# Patient Record
Sex: FEMALE | Race: WHITE | HISPANIC OR LATINO | Employment: UNEMPLOYED | URBAN - METROPOLITAN AREA
[De-identification: names, ages, dates, MRNs, and addresses within clinical notes are randomized per-mention and may not be internally consistent; named-entity substitution may affect disease eponyms.]

---

## 2018-01-15 ENCOUNTER — HOSPITAL ENCOUNTER (INPATIENT)
Facility: HOSPITAL | Age: 58
LOS: 1 days | Discharge: HOME | DRG: 419 | End: 2018-01-18
Attending: EMERGENCY MEDICINE | Admitting: STUDENT IN AN ORGANIZED HEALTH CARE EDUCATION/TRAINING PROGRAM

## 2018-01-15 DIAGNOSIS — K81.9 CHOLECYSTITIS: Primary | ICD-10-CM

## 2018-01-15 DIAGNOSIS — K80.63 CALCULUS OF GALLBLADDER AND BILE DUCT WITH ACUTE CHOLECYSTITIS, WITH OBSTRUCTION: ICD-10-CM

## 2018-01-15 DIAGNOSIS — R10.9 ABDOMINAL PAIN: ICD-10-CM

## 2018-01-15 PROBLEM — K80.01 CHOLELITHIASIS WITH ACUTE CHOLECYSTITIS WITH BILIARY OBSTRUCTION: Status: ACTIVE | Noted: 2018-01-15

## 2018-01-15 LAB
ALBUMIN SERPL BCP-MCNC: 4 G/DL
ALP SERPL-CCNC: 224 U/L
ALT SERPL W/O P-5'-P-CCNC: 102 U/L
AMORPH CRY URNS QL MICRO: ABNORMAL
ANION GAP SERPL CALC-SCNC: 8 MMOL/L
AST SERPL-CCNC: 165 U/L
BACTERIA #/AREA URNS HPF: ABNORMAL /HPF
BASOPHILS # BLD AUTO: 0.02 K/UL
BASOPHILS NFR BLD: 0.3 %
BILIRUB SERPL-MCNC: 1.1 MG/DL
BILIRUB UR QL STRIP: NEGATIVE
BUN SERPL-MCNC: 11 MG/DL
CALCIUM SERPL-MCNC: 9.8 MG/DL
CHLORIDE SERPL-SCNC: 105 MMOL/L
CLARITY UR: ABNORMAL
CO2 SERPL-SCNC: 30 MMOL/L
COLOR UR: YELLOW
CREAT SERPL-MCNC: 0.7 MG/DL
DIFFERENTIAL METHOD: NORMAL
EOSINOPHIL # BLD AUTO: 0.1 K/UL
EOSINOPHIL NFR BLD: 1.6 %
ERYTHROCYTE [DISTWIDTH] IN BLOOD BY AUTOMATED COUNT: 13.3 %
EST. GFR  (AFRICAN AMERICAN): >60 ML/MIN/1.73 M^2
EST. GFR  (NON AFRICAN AMERICAN): >60 ML/MIN/1.73 M^2
GLUCOSE SERPL-MCNC: 126 MG/DL
GLUCOSE UR QL STRIP: NEGATIVE
HCT VFR BLD AUTO: 42.8 %
HGB BLD-MCNC: 14 G/DL
HGB UR QL STRIP: NEGATIVE
KETONES UR QL STRIP: NEGATIVE
LEUKOCYTE ESTERASE UR QL STRIP: NEGATIVE
LIPASE SERPL-CCNC: 27 U/L
LYMPHOCYTES # BLD AUTO: 1.8 K/UL
LYMPHOCYTES NFR BLD: 22 %
MCH RBC QN AUTO: 28 PG
MCHC RBC AUTO-ENTMCNC: 32.7 G/DL
MCV RBC AUTO: 86 FL
MICROSCOPIC COMMENT: ABNORMAL
MONOCYTES # BLD AUTO: 0.7 K/UL
MONOCYTES NFR BLD: 9.2 %
NEUTROPHILS # BLD AUTO: 5.3 K/UL
NEUTROPHILS NFR BLD: 66.8 %
NITRITE UR QL STRIP: NEGATIVE
PH UR STRIP: >8 [PH] (ref 5–8)
PLATELET # BLD AUTO: 274 K/UL
PMV BLD AUTO: 10.6 FL
POTASSIUM SERPL-SCNC: 3.8 MMOL/L
PROT SERPL-MCNC: 7.8 G/DL
PROT UR QL STRIP: ABNORMAL
RBC # BLD AUTO: 5 M/UL
RBC #/AREA URNS HPF: 0 /HPF (ref 0–4)
SODIUM SERPL-SCNC: 143 MMOL/L
SP GR UR STRIP: 1.02 (ref 1–1.03)
SQUAMOUS #/AREA URNS HPF: ABNORMAL /HPF
URN SPEC COLLECT METH UR: ABNORMAL
UROBILINOGEN UR STRIP-ACNC: ABNORMAL EU/DL
WBC # BLD AUTO: 7.97 K/UL
WBC #/AREA URNS HPF: 0 /HPF (ref 0–5)

## 2018-01-15 PROCEDURE — 25000003 PHARM REV CODE 250: Performed by: EMERGENCY MEDICINE

## 2018-01-15 PROCEDURE — 80053 COMPREHEN METABOLIC PANEL: CPT

## 2018-01-15 PROCEDURE — 25000003 PHARM REV CODE 250: Performed by: STUDENT IN AN ORGANIZED HEALTH CARE EDUCATION/TRAINING PROGRAM

## 2018-01-15 PROCEDURE — 85025 COMPLETE CBC W/AUTO DIFF WBC: CPT

## 2018-01-15 PROCEDURE — 94761 N-INVAS EAR/PLS OXIMETRY MLT: CPT

## 2018-01-15 PROCEDURE — 93005 ELECTROCARDIOGRAM TRACING: CPT

## 2018-01-15 PROCEDURE — 83690 ASSAY OF LIPASE: CPT

## 2018-01-15 PROCEDURE — 63600175 PHARM REV CODE 636 W HCPCS: Performed by: STUDENT IN AN ORGANIZED HEALTH CARE EDUCATION/TRAINING PROGRAM

## 2018-01-15 PROCEDURE — G0378 HOSPITAL OBSERVATION PER HR: HCPCS

## 2018-01-15 PROCEDURE — 81000 URINALYSIS NONAUTO W/SCOPE: CPT

## 2018-01-15 PROCEDURE — 99284 EMERGENCY DEPT VISIT MOD MDM: CPT

## 2018-01-15 RX ORDER — SODIUM CHLORIDE 9 MG/ML
INJECTION, SOLUTION INTRAVENOUS CONTINUOUS
Status: DISCONTINUED | OUTPATIENT
Start: 2018-01-15 | End: 2018-01-17

## 2018-01-15 RX ORDER — HEPARIN SODIUM 5000 [USP'U]/ML
5000 INJECTION, SOLUTION INTRAVENOUS; SUBCUTANEOUS EVERY 8 HOURS
Status: DISCONTINUED | OUTPATIENT
Start: 2018-01-15 | End: 2018-01-18 | Stop reason: HOSPADM

## 2018-01-15 RX ORDER — HYDROCODONE BITARTRATE AND ACETAMINOPHEN 5; 325 MG/1; MG/1
1 TABLET ORAL EVERY 4 HOURS PRN
Status: DISCONTINUED | OUTPATIENT
Start: 2018-01-15 | End: 2018-01-18 | Stop reason: HOSPADM

## 2018-01-15 RX ORDER — ONDANSETRON 4 MG/1
8 TABLET, ORALLY DISINTEGRATING ORAL
Status: COMPLETED | OUTPATIENT
Start: 2018-01-15 | End: 2018-01-15

## 2018-01-15 RX ORDER — HYDROCODONE BITARTRATE AND ACETAMINOPHEN 5; 325 MG/1; MG/1
1 TABLET ORAL
Status: COMPLETED | OUTPATIENT
Start: 2018-01-15 | End: 2018-01-15

## 2018-01-15 RX ORDER — ONDANSETRON 8 MG/1
8 TABLET, ORALLY DISINTEGRATING ORAL EVERY 8 HOURS PRN
Status: DISCONTINUED | OUTPATIENT
Start: 2018-01-15 | End: 2018-01-18 | Stop reason: HOSPADM

## 2018-01-15 RX ADMIN — HYDROCODONE BITARTRATE AND ACETAMINOPHEN 1 TABLET: 5; 325 TABLET ORAL at 06:01

## 2018-01-15 RX ADMIN — HEPARIN SODIUM 5000 UNITS: 5000 INJECTION, SOLUTION INTRAVENOUS; SUBCUTANEOUS at 09:01

## 2018-01-15 RX ADMIN — HYDROCODONE BITARTRATE AND ACETAMINOPHEN 1 TABLET: 5; 325 TABLET ORAL at 10:01

## 2018-01-15 RX ADMIN — SODIUM CHLORIDE: 0.9 INJECTION, SOLUTION INTRAVENOUS at 03:01

## 2018-01-15 RX ADMIN — ONDANSETRON 8 MG: 4 TABLET, ORALLY DISINTEGRATING ORAL at 10:01

## 2018-01-15 RX ADMIN — SODIUM CHLORIDE: 0.9 INJECTION, SOLUTION INTRAVENOUS at 09:01

## 2018-01-15 NOTE — H&P
Patient ID: Radha Livingston is a 57 y.o. female.    Chief Complaint: Abdominal Pain (epigastric pain x days.pain radiates to back. )      HPI:  57F presented to ED with RUQ pain for 3 days. Pain is severe, constant. She had a minor episode of similar type pain 6 months ago that resolved shortly after it began. Denies fever.  Some nausea but no vomiting. Regular BMs.         Review of Systems   Constitutional: Negative for fever.   HENT: Negative for trouble swallowing.    Respiratory: Negative for shortness of breath.    Cardiovascular: Negative for chest pain.   Gastrointestinal: Positive for abdominal pain and nausea. Negative for blood in stool, constipation, diarrhea and vomiting.   Genitourinary: Negative for dysuria.   Musculoskeletal: Negative for joint swelling.   Skin: Negative for rash and wound.   Allergic/Immunologic: Negative for immunocompromised state.   Neurological: Negative for weakness.   Hematological: Does not bruise/bleed easily.   Psychiatric/Behavioral: Negative for agitation.       No current facility-administered medications for this encounter.      No current outpatient prescriptions on file.       Review of patient's allergies indicates:  No Known Allergies    No past medical history on file.    No past surgical history on file.    No family history on file.    Social History     Social History    Marital status:      Spouse name: N/A    Number of children: N/A    Years of education: N/A     Occupational History    Not on file.     Social History Main Topics    Smoking status: Not on file    Smokeless tobacco: Not on file    Alcohol use Not on file    Drug use: Unknown    Sexual activity: Not on file     Other Topics Concern    Not on file     Social History Narrative    No narrative on file       Vitals:    01/15/18 1150   BP: 115/65   Pulse: 85   Resp: (!) 22   Temp:        Physical Exam   Constitutional: She is oriented to person, place, and time. She appears  well-nourished. No distress.   Cardiovascular: Normal rate and regular rhythm.    Pulmonary/Chest: Effort normal. No stridor. No respiratory distress.   Abdominal: Soft. She exhibits no distension and no mass. There is tenderness. No hernia.   Mild RUQ TTP   Lymphadenopathy:     She has no cervical adenopathy.   Neurological: She is alert and oriented to person, place, and time.   Skin: Skin is warm. No erythema.   Psychiatric: She has a normal mood and affect. Her behavior is normal.     AST/ALT mildy elevated  Lipase normal  WBC normal  US gb wall, 2cm gallstones, cbd 8mm  Tbili 1.1    Assessment & Plan:   57F with cholecystitis, choledocholithiasis  Admit to surgery  Trend labs  NPO  IVF  Lap rupali with ioc possibly tomorrow if labs normalize, otherwise will consult GI

## 2018-01-15 NOTE — PROGRESS NOTES
Patient is an admit from ED.  Patient is a Urdu-speaking woman is awake, alert, and oriented.  Nephew and brother are at bedside and able to translate for patient at this time.  Patient denies shortness of breath but has mild abdominal pain.  Patient is resting in bed at this time.  Safety is maintained with bed low, wheels locked, and side rails up.  Call light within reach.  Will continue to monitor.

## 2018-01-15 NOTE — ED NOTES
"Pt reports that she last ate last night at 6pm. Pt reports that she had a couple sips of water "not too long ago."  "

## 2018-01-15 NOTE — ED NOTES
Pt undressed and in gown. Headset and clothes put in pt belongings bag and given to pt's son at bedside

## 2018-01-15 NOTE — ED PROVIDER NOTES
Encounter Date: 1/15/2018    SCRIBE #1 NOTE: I, Ashley Staci, am scribing for, and in the presence of, Dr. Mora.       History     Chief Complaint   Patient presents with    Abdominal Pain     epigastric pain x days.pain radiates to back.      Time seen by provider: 0950    This is a 57 y.o. female who presents with complaint of constant epigastric RUQ pain radiating to the back and chest onset 3 days ago. Her pain is described as flipping. The pain woke her from her sleep 3 nights ago. The patient reports some relief last night with the pain returning this morning. She took Alkaseltzer 2 days ago and OTC acid medicine this morning with no improvement. She had similar symptoms 6 months ago however the pain resolved. The patient associates nausea and stress due to the recent loss of her mother. The patient denies vomiting, diarrhea, dysuria. She has been able to keep her food down and eat normally since the onset of her symptoms. Her last BM was this morning. She drinks alcohol occasionally and does not smoke cigarettes.  No etoh over the past week. She has no known PMHx. The patient's family members served as an .       The history is provided by the patient. The history is limited by a language barrier. A  was used.     Review of patient's allergies indicates:  No Known Allergies  No past medical history on file.  No past surgical history on file.  No family history on file.  Social History   Substance Use Topics    Smoking status: Not on file    Smokeless tobacco: Not on file    Alcohol use Not on file     Review of Systems   Constitutional: Negative for activity change, appetite change, chills, diaphoresis and fatigue.   Respiratory: Negative for cough, chest tightness and shortness of breath.    Cardiovascular: Negative for chest pain.   Gastrointestinal: Positive for abdominal pain (Epigastric, RUQ) and nausea. Negative for diarrhea and vomiting.   Endocrine: Negative for  polydipsia and polyphagia.   Genitourinary: Negative for difficulty urinating, dysuria, flank pain and frequency.   Musculoskeletal: Negative for myalgias.   Skin: Negative for pallor and rash.   Allergic/Immunologic: Negative for immunocompromised state.   Neurological: Negative for dizziness and headaches.   Psychiatric/Behavioral: Negative for confusion. The patient is nervous/anxious (stress due to loss of her mother).    All other systems reviewed and are negative.      Physical Exam     Initial Vitals [01/15/18 0912]   BP Pulse Resp Temp SpO2   (!) 144/76 (!) 114 18 98.8 °F (37.1 °C) 96 %      MAP       98.67         Physical Exam    Nursing note and vitals reviewed.  Constitutional: She appears well-developed and well-nourished. She is not diaphoretic. No distress.   Does not appear dehydrated.    HENT:   Head: Normocephalic and atraumatic.   Mouth/Throat: Oropharynx is clear and moist.   Eyes: Conjunctivae and EOM are normal. No scleral icterus.   Neck: Normal range of motion. Neck supple.   Cardiovascular: Normal rate, regular rhythm, normal heart sounds and intact distal pulses. Exam reveals no gallop and no friction rub.    No murmur heard.  Pulmonary/Chest: Breath sounds normal. She has no wheezes. She has no rhonchi. She has no rales.   Abdominal: Soft. Bowel sounds are normal. She exhibits no distension. There is tenderness (epigastric and RUQ tenderness to palpation). There is no rebound and no guarding.   No rigidity   Musculoskeletal: Normal range of motion.   Neurological: She is alert and oriented to person, place, and time.   Skin: Skin is warm and dry. No rash noted. No erythema.   Psychiatric: She has a normal mood and affect. Her behavior is normal. Thought content normal.         ED Course   Procedures  Labs Reviewed   COMPREHENSIVE METABOLIC PANEL - Abnormal; Notable for the following:        Result Value    CO2 30 (*)     Glucose 126 (*)     Total Bilirubin 1.1 (*)     Alkaline Phosphatase  224 (*)      (*)      (*)     All other components within normal limits   URINALYSIS - Abnormal; Notable for the following:     Appearance, UA Cloudy (*)     pH, UA >8.0 (*)     Protein, UA Trace (*)     Urobilinogen, UA 4.0-6.0 (*)     All other components within normal limits   URINALYSIS MICROSCOPIC - Abnormal; Notable for the following:     Amorphous, UA Many (*)     All other components within normal limits   CBC W/ AUTO DIFFERENTIAL   LIPASE     EKG Readings: (Independently Interpreted)   Initial Reading: No STEMI. Rhythm: Sinus Tachycardia. Heart Rate: 102. Ectopy: No Ectopy. Clinical Impression: Sinus Tachycardia   Normal conduction, no ectopy, no prior EKG for comparison.        X-Rays:   Independently Interpreted Readings:   Other Readings:  Reviewed by myself, read by radiology.      US Abdomen Limited   Final Result   Abnormal         Cholelithiasis with gallbladder wall thickening and pericholecystic fluid.  Unable to assess for a Tracy's sign given patient's prior administration of pain medications.  Findings can be seen in acute and chronic cholecystitis as well as liver disease, heart failure or hypoalbuminemia.  Further evaluation could be obtained with HIDA scan as clinically warranted.      Hepatic steatosis.      This report was Flagged in the Epic Medical record.          Electronically signed by: VALENTINO PIZANO MD   Date:     01/15/18   Time:    10:59          Medical Decision Making:   Initial Assessment:   This is a 57 year old female with no PMHx who presents with RUQ and epigastric tenderness. Will treat with PO pain and nausea medication and obtain blood work, urinalysis, EKG, US of gall bladder.  Differential Diagnosis:   Pancreatitis, gastritis, gall stone, cholecystitis, cholelithiasis, anxiety.  Independently Interpreted Test(s):   I have ordered and independently interpreted X-rays - see prior notes.  I have ordered and independently interpreted EKG Reading(s) - see  prior notes  Clinical Tests:   Lab Tests: Ordered and Reviewed  Radiological Study: Ordered and Reviewed  Medical Tests: Ordered and Reviewed  ED Management:  1144 Findings consistent with cholecystitis. She also has elevated liever enzymes, normal wbc, is afebrile, normal lipase. I paged Dr. Hull with [facility] to discuss her CT findings.    1220 I spoke with Dr. Hull about the case. He will admit the patient for surgery.  Pt and her family agree with the plan       Other:   I have discussed this case with another health care provider.                   ED Course      Clinical Impression:     1. Cholecystitis    2. Abdominal pain         Disposition:   Disposition: Admitted  Condition: Stable       Scribe attestation:I, Dr. Shea Mora, personally performed the services described in this documentation. All medical record entries made by the scribe were at my direction and in my presence.  I have reviewed the chart and agree that the record reflects my personal performance and is accurate and complete. Shea Mora MD.  3:49 PM 01/15/2018                      Shea Mora MD  01/15/18 8000

## 2018-01-15 NOTE — ED NOTES
Pt here c/o mid epigastric pain described as cramping with nausea for last 3 days--sometimes it radiates to bilateral chest and goes through to her back. Denies history of diabetes or heart problems. Skin WDL> pt is AAOx3,speech is clear. resp are regular and unlabored. Pt's mother dies unexpectedly Friday. Pt denies any daily medicines. Pt unable to describe the chest and back pain.

## 2018-01-16 ENCOUNTER — ANESTHESIA EVENT (OUTPATIENT)
Dept: ENDOSCOPY | Facility: HOSPITAL | Age: 58
DRG: 419 | End: 2018-01-16

## 2018-01-16 ENCOUNTER — SURGERY (OUTPATIENT)
Age: 58
End: 2018-01-16

## 2018-01-16 ENCOUNTER — ANESTHESIA (OUTPATIENT)
Dept: SURGERY | Facility: HOSPITAL | Age: 58
End: 2018-01-16

## 2018-01-16 ENCOUNTER — ANESTHESIA (OUTPATIENT)
Dept: ENDOSCOPY | Facility: HOSPITAL | Age: 58
DRG: 419 | End: 2018-01-16

## 2018-01-16 ENCOUNTER — TELEPHONE (OUTPATIENT)
Dept: GASTROENTEROLOGY | Facility: CLINIC | Age: 58
End: 2018-01-16

## 2018-01-16 ENCOUNTER — ANESTHESIA EVENT (OUTPATIENT)
Dept: SURGERY | Facility: HOSPITAL | Age: 58
DRG: 419 | End: 2018-01-16

## 2018-01-16 ENCOUNTER — ANESTHESIA EVENT (OUTPATIENT)
Dept: SURGERY | Facility: HOSPITAL | Age: 58
End: 2018-01-16

## 2018-01-16 LAB
ALBUMIN SERPL BCP-MCNC: 3.4 G/DL
ALP SERPL-CCNC: 413 U/L
ALT SERPL W/O P-5'-P-CCNC: 1066 U/L
ANION GAP SERPL CALC-SCNC: 9 MMOL/L
AST SERPL-CCNC: 1042 U/L
BASOPHILS # BLD AUTO: 0.01 K/UL
BASOPHILS NFR BLD: 0.2 %
BILIRUB SERPL-MCNC: 3.2 MG/DL
BUN SERPL-MCNC: 7 MG/DL
CALCIUM SERPL-MCNC: 9.1 MG/DL
CHLORIDE SERPL-SCNC: 106 MMOL/L
CO2 SERPL-SCNC: 25 MMOL/L
CREAT SERPL-MCNC: 0.7 MG/DL
DIFFERENTIAL METHOD: NORMAL
EOSINOPHIL # BLD AUTO: 0.1 K/UL
EOSINOPHIL NFR BLD: 2.5 %
ERYTHROCYTE [DISTWIDTH] IN BLOOD BY AUTOMATED COUNT: 13.5 %
EST. GFR  (AFRICAN AMERICAN): >60 ML/MIN/1.73 M^2
EST. GFR  (NON AFRICAN AMERICAN): >60 ML/MIN/1.73 M^2
GLUCOSE SERPL-MCNC: 103 MG/DL
HCT VFR BLD AUTO: 39.4 %
HGB BLD-MCNC: 12.8 G/DL
LYMPHOCYTES # BLD AUTO: 1.3 K/UL
LYMPHOCYTES NFR BLD: 26.5 %
MCH RBC QN AUTO: 27.8 PG
MCHC RBC AUTO-ENTMCNC: 32.5 G/DL
MCV RBC AUTO: 86 FL
MONOCYTES # BLD AUTO: 0.5 K/UL
MONOCYTES NFR BLD: 10.9 %
NEUTROPHILS # BLD AUTO: 2.9 K/UL
NEUTROPHILS NFR BLD: 59.9 %
PLATELET # BLD AUTO: 256 K/UL
PMV BLD AUTO: 10.6 FL
POTASSIUM SERPL-SCNC: 3.7 MMOL/L
PROT SERPL-MCNC: 6.7 G/DL
RBC # BLD AUTO: 4.6 M/UL
SODIUM SERPL-SCNC: 140 MMOL/L
WBC # BLD AUTO: 4.79 K/UL

## 2018-01-16 PROCEDURE — C1769 GUIDE WIRE: HCPCS | Performed by: INTERNAL MEDICINE

## 2018-01-16 PROCEDURE — 25000003 PHARM REV CODE 250: Performed by: STUDENT IN AN ORGANIZED HEALTH CARE EDUCATION/TRAINING PROGRAM

## 2018-01-16 PROCEDURE — 0FC98ZZ EXTIRPATION OF MATTER FROM COMMON BILE DUCT, VIA NATURAL OR ARTIFICIAL OPENING ENDOSCOPIC: ICD-10-PCS | Performed by: INTERNAL MEDICINE

## 2018-01-16 PROCEDURE — 0F7D8DZ DILATION OF PANCREATIC DUCT WITH INTRALUMINAL DEVICE, VIA NATURAL OR ARTIFICIAL OPENING ENDOSCOPIC: ICD-10-PCS | Performed by: INTERNAL MEDICINE

## 2018-01-16 PROCEDURE — 85025 COMPLETE CBC W/AUTO DIFF WBC: CPT

## 2018-01-16 PROCEDURE — 43274 ERCP DUCT STENT PLACEMENT: CPT | Mod: 59,,, | Performed by: INTERNAL MEDICINE

## 2018-01-16 PROCEDURE — 37000009 HC ANESTHESIA EA ADD 15 MINS: Performed by: INTERNAL MEDICINE

## 2018-01-16 PROCEDURE — 63600175 PHARM REV CODE 636 W HCPCS: Performed by: STUDENT IN AN ORGANIZED HEALTH CARE EDUCATION/TRAINING PROGRAM

## 2018-01-16 PROCEDURE — 27202125 HC BALLOON, EXTRACTION (ANY): Performed by: INTERNAL MEDICINE

## 2018-01-16 PROCEDURE — 36415 COLL VENOUS BLD VENIPUNCTURE: CPT

## 2018-01-16 PROCEDURE — 94761 N-INVAS EAR/PLS OXIMETRY MLT: CPT

## 2018-01-16 PROCEDURE — 43274 ERCP DUCT STENT PLACEMENT: CPT | Performed by: INTERNAL MEDICINE

## 2018-01-16 PROCEDURE — C2617 STENT, NON-COR, TEM W/O DEL: HCPCS | Performed by: INTERNAL MEDICINE

## 2018-01-16 PROCEDURE — 27202127 HC STENT INTRODUCER: Performed by: INTERNAL MEDICINE

## 2018-01-16 PROCEDURE — 25000003 PHARM REV CODE 250: Performed by: SURGERY

## 2018-01-16 PROCEDURE — 63600175 PHARM REV CODE 636 W HCPCS: Performed by: NURSE ANESTHETIST, CERTIFIED REGISTERED

## 2018-01-16 PROCEDURE — 43264 ERCP REMOVE DUCT CALCULI: CPT | Performed by: INTERNAL MEDICINE

## 2018-01-16 PROCEDURE — 37000008 HC ANESTHESIA 1ST 15 MINUTES: Performed by: INTERNAL MEDICINE

## 2018-01-16 PROCEDURE — 99232 SBSQ HOSP IP/OBS MODERATE 35: CPT | Mod: ,,, | Performed by: STUDENT IN AN ORGANIZED HEALTH CARE EDUCATION/TRAINING PROGRAM

## 2018-01-16 PROCEDURE — 0F798DZ DILATION OF COMMON BILE DUCT WITH INTRALUMINAL DEVICE, VIA NATURAL OR ARTIFICIAL OPENING ENDOSCOPIC: ICD-10-PCS | Performed by: INTERNAL MEDICINE

## 2018-01-16 PROCEDURE — 74330 X-RAY BILE/PANC ENDOSCOPY: CPT | Mod: 26,,, | Performed by: INTERNAL MEDICINE

## 2018-01-16 PROCEDURE — 80053 COMPREHEN METABOLIC PANEL: CPT

## 2018-01-16 PROCEDURE — 27202304 HC CANNULA, ERCP: Performed by: INTERNAL MEDICINE

## 2018-01-16 PROCEDURE — 43264 ERCP REMOVE DUCT CALCULI: CPT | Mod: 51,,, | Performed by: INTERNAL MEDICINE

## 2018-01-16 PROCEDURE — G0378 HOSPITAL OBSERVATION PER HR: HCPCS

## 2018-01-16 RX ORDER — FENTANYL CITRATE 50 UG/ML
INJECTION, SOLUTION INTRAMUSCULAR; INTRAVENOUS
Status: DISCONTINUED | OUTPATIENT
Start: 2018-01-16 | End: 2018-01-16

## 2018-01-16 RX ORDER — INDOMETHACIN 50 MG/1
100 SUPPOSITORY RECTAL ONCE
Status: DISCONTINUED | OUTPATIENT
Start: 2018-01-16 | End: 2018-01-18 | Stop reason: HOSPADM

## 2018-01-16 RX ORDER — MIDAZOLAM HYDROCHLORIDE 1 MG/ML
INJECTION, SOLUTION INTRAMUSCULAR; INTRAVENOUS
Status: DISCONTINUED | OUTPATIENT
Start: 2018-01-16 | End: 2018-01-16

## 2018-01-16 RX ORDER — PROPOFOL 10 MG/ML
VIAL (ML) INTRAVENOUS CONTINUOUS PRN
Status: DISCONTINUED | OUTPATIENT
Start: 2018-01-16 | End: 2018-01-16

## 2018-01-16 RX ORDER — PROPOFOL 10 MG/ML
VIAL (ML) INTRAVENOUS
Status: DISCONTINUED | OUTPATIENT
Start: 2018-01-16 | End: 2018-01-16

## 2018-01-16 RX ORDER — LIDOCAINE HCL/PF 100 MG/5ML
SYRINGE (ML) INTRAVENOUS
Status: DISCONTINUED | OUTPATIENT
Start: 2018-01-16 | End: 2018-01-16

## 2018-01-16 RX ORDER — DEXTROSE MONOHYDRATE, SODIUM CHLORIDE, AND POTASSIUM CHLORIDE 50; 1.49; 4.5 G/1000ML; G/1000ML; G/1000ML
INJECTION, SOLUTION INTRAVENOUS CONTINUOUS
Status: DISCONTINUED | OUTPATIENT
Start: 2018-01-16 | End: 2018-01-16

## 2018-01-16 RX ADMIN — PROPOFOL 50 MG: 10 INJECTION, EMULSION INTRAVENOUS at 01:01

## 2018-01-16 RX ADMIN — MIDAZOLAM 2 MG: 1 INJECTION INTRAMUSCULAR; INTRAVENOUS at 01:01

## 2018-01-16 RX ADMIN — FENTANYL CITRATE 50 MCG: 50 INJECTION, SOLUTION INTRAMUSCULAR; INTRAVENOUS at 01:01

## 2018-01-16 RX ADMIN — LIDOCAINE HYDROCHLORIDE 50 MG: 20 INJECTION, SOLUTION INTRAVENOUS at 01:01

## 2018-01-16 RX ADMIN — HYDROCODONE BITARTRATE AND ACETAMINOPHEN 1 TABLET: 5; 325 TABLET ORAL at 09:01

## 2018-01-16 RX ADMIN — DEXTROSE MONOHYDRATE, SODIUM CHLORIDE, AND POTASSIUM CHLORIDE: 50; 4.5; 1.49 INJECTION, SOLUTION INTRAVENOUS at 11:01

## 2018-01-16 RX ADMIN — HEPARIN SODIUM 5000 UNITS: 5000 INJECTION, SOLUTION INTRAVENOUS; SUBCUTANEOUS at 09:01

## 2018-01-16 RX ADMIN — HYDROCODONE BITARTRATE AND ACETAMINOPHEN 1 TABLET: 5; 325 TABLET ORAL at 04:01

## 2018-01-16 RX ADMIN — PROPOFOL 150 MCG/KG/MIN: 10 INJECTION, EMULSION INTRAVENOUS at 01:01

## 2018-01-16 RX ADMIN — SODIUM CHLORIDE: 0.9 INJECTION, SOLUTION INTRAVENOUS at 09:01

## 2018-01-16 NOTE — ANESTHESIA PREPROCEDURE EVALUATION
01/16/2018  Radha Livingston is a 57 y.o., female having an ERCP and probable lap rupali tomorrow.  NPO  today, NKDA, speaks only Tajik, admitted for abdominal pain.    Anesthesia Evaluation    I have reviewed the Patient Summary Reports.    I have reviewed the Nursing Notes.      Review of Systems  Anesthesia Hx:  No problems with previous Anesthesia Denies Hx of Anesthetic complications    Social:  Non-Smoker    Hematology/Oncology:  Hematology Normal        Cardiovascular:  Cardiovascular Normal Exercise tolerance: good  ECG has been reviewed.   ECG ST   Pulmonary:  Pulmonary Normal    Renal/:  Renal/ Normal     Hepatic/GI:   Abdominal pain   Neurological:  Neurology Normal    Endocrine:  Endocrine Normal        Physical Exam  General:  Well nourished    Airway/Jaw/Neck:  Airway Findings: Mouth Opening: Normal Tongue: Normal  General Airway Assessment: Adult  Mallampati: III  Improves to II with phonation.  TM Distance: Normal, at least 6 cm  Jaw/Neck Findings:  Neck ROM: Normal ROM       Chest/Lungs:  Chest/Lungs Findings: Clear to auscultation, Normal Respiratory Rate     Heart/Vascular:  Heart Findings: Rate: Normal  Rhythm: Regular Rhythm  Sounds: Normal        Mental Status:  Mental Status Findings:  Cooperative       Lab Results   Component Value Date    WBC 4.79 01/16/2018    HGB 12.8 01/16/2018    HCT 39.4 01/16/2018     01/16/2018    ALT 1,066 (H) 01/16/2018    AST 1,042 (H) 01/16/2018     01/16/2018    K 3.7 01/16/2018     01/16/2018    CREATININE 0.7 01/16/2018    BUN 7 01/16/2018    CO2 25 01/16/2018     Wt Readings from Last 3 Encounters:   01/15/18 58 kg (127 lb 13.9 oz)     Temp Readings from Last 3 Encounters:   01/16/18 36.8 °C (98.3 °F) (Oral)     BP Readings from Last 3 Encounters:   01/16/18 121/67     Pulse Readings from Last 3 Encounters:   01/16/18 66          Anesthesia Plan  Type of Anesthesia, risks & benefits discussed:  Anesthesia Type:  general  Patient's Preference:   Intra-op Monitoring Plan: standard ASA monitors  Intra-op Monitoring Plan Comments:   Post Op Pain Control Plan: multimodal analgesia  Post Op Pain Control Plan Comments:   Induction:   IV  Beta Blocker:         Informed Consent: Patient understands risks and agrees with Anesthesia plan.  Questions answered. Anesthesia consent signed with patient.  ASA Score: 2     Day of Surgery Review of History & Physical:    H&P update referred to the surgeon.         Ready For Surgery From Anesthesia Perspective.

## 2018-01-16 NOTE — ANESTHESIA POSTPROCEDURE EVALUATION
"Anesthesia Post Evaluation    Patient: Radha Livingston    Procedure(s) Performed: Procedure(s) (LRB):  ERCP (N/A)    Final Anesthesia Type: MAC  Patient location during evaluation: GI PACU  Patient participation: Yes- Able to Participate  Level of consciousness: awake and responds to stimulation  Post-procedure vital signs: reviewed and stable  Pain management: adequate  Airway patency: patent  PONV status at discharge: No PONV  Anesthetic complications: no      Cardiovascular status: blood pressure returned to baseline and hemodynamically stable  Respiratory status: unassisted, spontaneous ventilation and room air  Hydration status: euvolemic  Follow-up not needed.        Visit Vitals  /67 (Patient Position: Lying)   Pulse 66   Temp 36.8 °C (98.3 °F) (Oral)   Resp 18   Ht 4' 11" (1.499 m)   Wt 58 kg (127 lb 13.9 oz)   SpO2 96%   Breastfeeding? No   BMI 25.83 kg/m²       Pain/Natividad Score: Pain Assessment Performed: Yes (1/16/2018 11:09 AM)  Presence of Pain: denies (1/16/2018 11:09 AM)  Pain Rating Prior to Med Admin: 7 (1/16/2018  9:12 AM)      "

## 2018-01-16 NOTE — DISCHARGE INSTRUCTIONS
Post ERCP Discharge Instructions:     Radha Livingston  1/16/2018  Edward Hull MD    1. Diet: Try sips of water first. If tolerated, resume your regular diet or one recommended by your physician.  2. Do not drive, or operate machinery, make critical decisions, or do activities that require coordination or balance for 24 hours.  3. You may experience a sore throat for 24 to 48 hours. You may use throat lozenges or gargle with warm salt water to relieve the discomfort.  4. Because air was put into your stomach during the procedure, you may experience some belching.  5. Go directly to the emergency room if you notice any of the following:                              *Chills and/or fever over 101                *Persistent vomiting or vomiting with blood                *Severe abdominal pain, other than gas cramps                *Severe chest pain                *Black, tarry stools    If you have any questions or problems, please call your Physician:    Edward Hull MD     If a complication or emergency situation arises and you are unable to reach your Physician - GO TO THE EMERGENCY ROOM.

## 2018-01-16 NOTE — ANESTHESIA PREPROCEDURE EVALUATION
01/16/2018  Radha Livingston is a 57 y.o., female.    Pre-op Assessment    I have reviewed the Patient Summary Reports.     I have reviewed the Nursing Notes.      Review of Systems  Anesthesia Hx:  No problems with previous Anesthesia Denies Hx of Anesthetic complications    Social:  Non-Smoker    Hematology/Oncology:  Hematology Normal        Cardiovascular:  Cardiovascular Normal Exercise tolerance: good  ECG has been reviewed.   ECG ST   Pulmonary:  Pulmonary Normal    Renal/:  Renal/ Normal     Hepatic/GI:   Abdominal pain   Neurological:  Neurology Normal    Endocrine:  Endocrine Normal        Physical Exam  General:  Well nourished    Airway/Jaw/Neck:  Airway Findings: Mouth Opening: Normal Tongue: Normal  General Airway Assessment: Adult  Mallampati: III  Improves to II with phonation.  TM Distance: Normal, at least 6 cm       Chest/Lungs:  Chest/Lungs Findings: Clear to auscultation, Normal Respiratory Rate     Heart/Vascular:  Heart Findings: Rate: Normal  Rhythm: Regular Rhythm  Sounds: Normal        Mental Status:  Mental Status Findings:       Lab Results   Component Value Date    WBC 4.79 01/16/2018    HGB 12.8 01/16/2018    HCT 39.4 01/16/2018     01/16/2018     (H) 01/15/2018     (H) 01/15/2018     01/15/2018    K 3.8 01/15/2018     01/15/2018    CREATININE 0.7 01/15/2018    BUN 11 01/15/2018    CO2 30 (H) 01/15/2018     Wt Readings from Last 3 Encounters:   01/15/18 58 kg (127 lb 13.9 oz)     Temp Readings from Last 3 Encounters:   01/16/18 36.8 °C (98.2 °F) (Oral)     BP Readings from Last 3 Encounters:   01/16/18 120/72     Pulse Readings from Last 3 Encounters:   01/16/18 67         Anesthesia Plan  Type of Anesthesia, risks & benefits discussed:  Anesthesia Type:  general  Patient's Preference:   Intra-op Monitoring Plan: standard ASA  monitors  Intra-op Monitoring Plan Comments:   Post Op Pain Control Plan: multimodal analgesia  Post Op Pain Control Plan Comments:   Induction:   IV  Beta Blocker:  Patient is not currently on a Beta-Blocker (No further documentation required).       Informed Consent: Patient understands risks and agrees with Anesthesia plan.  Questions answered. Anesthesia consent signed with patient.  ASA Score: 2     Day of Surgery Review of History & Physical:    H&P update referred to the surgeon.

## 2018-01-16 NOTE — PLAN OF CARE
Problem: Patient Care Overview  Goal: Plan of Care Review  Outcome: Revised  Patient is awake and alert.  Patient went to Endoscopy this shift.  Instructed on clear liquid diet for now and will be nothing by mouth at midnight as patient will go to surgery.  Patient verbalized understanding.  Children and  visited.  Patient continues to receive IVF.  Denies pain.  Family member at bedside at all times.  Instructed to call for assistance if needed.  Bed alarm on.  Safety maintained.  Will continue to monitor.

## 2018-01-16 NOTE — ANESTHESIA PREPROCEDURE EVALUATION
01/16/2018  Radha Livingston is a 57 y.o., female for lap rupali under GA.  ERCP 1/15/18, NKDA, speaks only Macedonian, admitted for abdominal pain.    Anesthesia Evaluation    I have reviewed the Patient Summary Reports.    I have reviewed the Nursing Notes.      Review of Systems  Anesthesia Hx:  No problems with previous Anesthesia Denies Hx of Anesthetic complications  History of prior surgery of interest to airway management or planning:  Denies Personal Hx of Anesthesia complications.   Social:  Non-Smoker    Hematology/Oncology:  Hematology Normal        Cardiovascular:  Cardiovascular Normal Exercise tolerance: good  ECG has been reviewed.   ECG ST   Pulmonary:  Pulmonary Normal    Renal/:  Renal/ Normal     Hepatic/GI:   Abdominal pain, elevated liver enzymes   Neurological:  Neurology Normal    Endocrine:  Endocrine Normal        Physical Exam  General:  Well nourished    Airway/Jaw/Neck:  Airway Findings: Mouth Opening: Normal Tongue: Normal  General Airway Assessment: Adult  Mallampati: III  Improves to II with phonation.  TM Distance: Normal, at least 6 cm  Jaw/Neck Findings:  Neck ROM: Normal ROM       Chest/Lungs:  Chest/Lungs Findings: Clear to auscultation, Normal Respiratory Rate     Heart/Vascular:  Heart Findings: Rate: Normal  Rhythm: Regular Rhythm  Sounds: Normal        Mental Status:  Mental Status Findings:  Cooperative       Lab Results   Component Value Date    WBC 4.79 01/16/2018    HGB 12.8 01/16/2018    HCT 39.4 01/16/2018     01/16/2018    ALT 1,066 (H) 01/16/2018    AST 1,042 (H) 01/16/2018     01/16/2018    K 3.7 01/16/2018     01/16/2018    CREATININE 0.7 01/16/2018    BUN 7 01/16/2018    CO2 25 01/16/2018     Wt Readings from Last 3 Encounters:   01/15/18 58 kg (127 lb 13.9 oz)     Temp Readings from Last 3 Encounters:   01/16/18 36.8 °C (98.3 °F) (Oral)      BP Readings from Last 3 Encounters:   01/16/18 121/67     Pulse Readings from Last 3 Encounters:   01/16/18 66     History reviewed. No pertinent past medical history.  Past Surgical History:   Procedure Laterality Date    TUBAL LIGATION       EKG 1/15/18:    Sinus tachycardia  Nonspecific ST and T wave abnormality  Abnormal ECG  No previous ECGs available      Anesthesia Plan  Type of Anesthesia, risks & benefits discussed:  Anesthesia Type:  general  Patient's Preference:   Intra-op Monitoring Plan: standard ASA monitors  Intra-op Monitoring Plan Comments:   Post Op Pain Control Plan: multimodal analgesia  Post Op Pain Control Plan Comments:   Induction:   IV  Beta Blocker:  Patient is not currently on a Beta-Blocker (No further documentation required).       Informed Consent: Patient understands risks and agrees with Anesthesia plan.  Questions answered. Anesthesia consent signed with patient.  ASA Score: 2     Day of Surgery Review of History & Physical:    H&P update referred to the surgeon.         Ready For Surgery From Anesthesia Perspective.

## 2018-01-16 NOTE — PROGRESS NOTES
Progress Note  Surgery    Admit Date: 1/15/2018  Attending: Kurtis   S/P: Procedure(s) (LRB):  CHOLECYSTECTOMY-LAPAROSCOPIC W/ CHOLANGIOGRAM (N/A)    Post-operative Day:      Hospital Day: 2    SUBJECTIVE:     No acute events overnight. Pain persists to RUQ.  NPO since yesterday afternoon.    OBJECTIVE:     Vital Signs (Most Recent)  Temp: 98.3 °F (36.8 °C) (01/16/18 0724)  Pulse: 66 (01/16/18 0724)  Resp: 18 (01/16/18 0724)  BP: 121/67 (01/16/18 0724)  SpO2: 96 % (01/16/18 0251)    Vital Signs Range (Last 24H):  Temp:  [98.2 °F (36.8 °C)-98.8 °F (37.1 °C)]   Pulse:  []   Resp:  [16-23]   BP: (113-144)/(65-78)   SpO2:  [95 %-97 %]     I & O (Last 24H):No intake or output data in the 24 hours ending 01/16/18 0741    Physical Exam:  Gen: NAD   HEENT: NCAT  CV: RRR, no m/r/g   Pulm: CAB, unlabored, symmetrical   Abd: Soft, mild tenderness RUQ. No rebound, guarding.   Extremities: no cyanosis or edema, or clubbing  Skin: Skin color, texture, turgor normal. No rashes or lesions    Laboratory:  CBC:   Recent Labs  Lab 01/16/18 0437   WBC 4.79   RBC 4.60   HGB 12.8   HCT 39.4      MCV 86   MCH 27.8   MCHC 32.5     CMP:   Recent Labs  Lab 01/16/18 0437      CALCIUM 9.1   ALBUMIN 3.4*   PROT 6.7      K 3.7   CO2 25      BUN 7   CREATININE 0.7   ALKPHOS 413*   ALT 1,066*   AST 1,042*   BILITOT 3.2*     Coagulation: No results for input(s): PT, INR, APTT in the last 168 hours.  Labs within the past 24 hours have been reviewed.    ASSESSMENT/PLAN:     Assessment: 57 year old woman with cholecystitis, choledocholithiasis, uptrending t bili.    Plan:  Consult GI today for ERC for CBD stone  NPO, IVF  Pain control  Trend bili  Lap rupali c IOC soon    Herb Hsieh MD  General Surgery, PGY-4  [phone]

## 2018-01-16 NOTE — PLAN OF CARE
TN met with patienat bedside.  Pt has no HH or dme needs prior to admit. Family,  and granddaughter, at bedside. TN completed assesment in Serbian.  Patient no longer sees Dr Miller. Patient states she cannot recal PCP name, but has attended [facility] in past.      Pt's  mentioned patient has [employer] HMOX, patient thinks it may have elapsed.      TN updated Marielle, . Patient' social has been run and no insurance or  [employer] plan found.      updated of above.   Patient's daughter Alana [phone] does speak english.      01/16/18 1702   Discharge Assessment   Assessment Type Discharge Planning Assessment   Confirmed/corrected address and phone number on facesheet? Yes   Assessment information obtained from? Patient   Communicated expected length of stay with patient/caregiver yes   Prior to hospitilization cognitive status: Alert/Oriented   Prior to hospitalization functional status: Independent   Current cognitive status: Alert/Oriented   Current Functional Status: Independent   Lives With child(ana laura), adult;spouse   Able to Return to Prior Arrangements yes   Is patient able to care for self after discharge? Yes   Who are your caregiver(s) and their phone number(s)? alana mcnally [phone]   Patient's perception of discharge disposition home or selfcare   Readmission Within The Last 30 Days no previous admission in last 30 days   Patient currently being followed by outpatient case management? No   Patient currently receives any other outside agency services? No   Equipment Currently Used at Home none   Do you have any problems affording any of your prescribed medications? No   Is the patient taking medications as prescribed? yes   Does the patient have transportation home? No   Does the patient receive services at the [facility]? No   Discharge Plan A Home with family   Discharge Plan B Home with family   Patient/Family In Agreement With Plan yes    Readmission Questionnaire   Have you felt down, depressed, or hopeless? 0

## 2018-01-16 NOTE — PROGRESS NOTES
Patient complains of pain and requesting pain medications.  Dr. Hull notified.  Pain medications ordered.  Will continue to monitor.

## 2018-01-16 NOTE — TELEPHONE ENCOUNTER
----- Message from Araceli Richmond sent at 2018  8:59 AM CST -----  Contact: Mesha Jay / [phone]  CONSULTS:     Patient: Adebayo Livingston    : 64    Clinic#: [id number]    Room number:  504    Referring MD:     Diagnosis: ercp    Person calling: Mesha / [phone]

## 2018-01-16 NOTE — TRANSFER OF CARE
"Anesthesia Transfer of Care Note    Patient: Radha Livingston    Procedure(s) Performed: Procedure(s) (LRB):  ERCP (N/A)    Patient location: GI    Anesthesia Type: MAC    Transport from OR: Transported from OR on room air with adequate spontaneous ventilation    Post pain: adequate analgesia    Post assessment: no apparent anesthetic complications    Post vital signs: stable    Level of consciousness: awake and responds to stimulation    Nausea/Vomiting: no nausea/vomiting    Complications: none    Transfer of care protocol was followed      Last vitals:   Visit Vitals  /67 (Patient Position: Lying)   Pulse 66   Temp 36.8 °C (98.3 °F) (Oral)   Resp 18   Ht 4' 11" (1.499 m)   Wt 58 kg (127 lb 13.9 oz)   SpO2 96%   Breastfeeding? No   BMI 25.83 kg/m²     "

## 2018-01-16 NOTE — PLAN OF CARE
Problem: Patient Care Overview  Goal: Plan of Care Review  Outcome: Ongoing (interventions implemented as appropriate)  Pt is AAOx3. Pt is Swedish speaking only. No complaints of nausea, vomiting, or diarrhea. No complaints of pain. NS @100. NPO. Hibiclens bath completed tonight and preop checklist started for surgery pallavi. Ambulated with assist. Safety precautions maintained. Tolerated all medications well.

## 2018-01-17 ENCOUNTER — SURGERY (OUTPATIENT)
Age: 58
End: 2018-01-17

## 2018-01-17 ENCOUNTER — ANESTHESIA (OUTPATIENT)
Dept: SURGERY | Facility: HOSPITAL | Age: 58
DRG: 419 | End: 2018-01-17

## 2018-01-17 PROBLEM — K81.9 CHOLECYSTITIS: Status: ACTIVE | Noted: 2018-01-17

## 2018-01-17 LAB
ALBUMIN SERPL BCP-MCNC: 3.4 G/DL
ALP SERPL-CCNC: 403 U/L
ALT SERPL W/O P-5'-P-CCNC: 861 U/L
ANION GAP SERPL CALC-SCNC: 8 MMOL/L
AST SERPL-CCNC: 408 U/L
BASOPHILS # BLD AUTO: 0.01 K/UL
BASOPHILS NFR BLD: 0.2 %
BILIRUB SERPL-MCNC: 2.8 MG/DL
BUN SERPL-MCNC: 5 MG/DL
CALCIUM SERPL-MCNC: 9.3 MG/DL
CHLORIDE SERPL-SCNC: 107 MMOL/L
CO2 SERPL-SCNC: 27 MMOL/L
CREAT SERPL-MCNC: 0.6 MG/DL
DIFFERENTIAL METHOD: NORMAL
EOSINOPHIL # BLD AUTO: 0.1 K/UL
EOSINOPHIL NFR BLD: 2.3 %
ERYTHROCYTE [DISTWIDTH] IN BLOOD BY AUTOMATED COUNT: 13.2 %
EST. GFR  (AFRICAN AMERICAN): >60 ML/MIN/1.73 M^2
EST. GFR  (NON AFRICAN AMERICAN): >60 ML/MIN/1.73 M^2
GLUCOSE SERPL-MCNC: 87 MG/DL
HCT VFR BLD AUTO: 38.6 %
HGB BLD-MCNC: 12.8 G/DL
LYMPHOCYTES # BLD AUTO: 1.4 K/UL
LYMPHOCYTES NFR BLD: 23.2 %
MCH RBC QN AUTO: 28.4 PG
MCHC RBC AUTO-ENTMCNC: 33.2 G/DL
MCV RBC AUTO: 86 FL
MONOCYTES # BLD AUTO: 0.5 K/UL
MONOCYTES NFR BLD: 7.9 %
NEUTROPHILS # BLD AUTO: 4 K/UL
NEUTROPHILS NFR BLD: 66.4 %
PLATELET # BLD AUTO: 259 K/UL
PMV BLD AUTO: 10.5 FL
POTASSIUM SERPL-SCNC: 3.5 MMOL/L
PROT SERPL-MCNC: 7.1 G/DL
RBC # BLD AUTO: 4.5 M/UL
SODIUM SERPL-SCNC: 142 MMOL/L
WBC # BLD AUTO: 5.96 K/UL

## 2018-01-17 PROCEDURE — 36000709 HC OR TIME LEV III EA ADD 15 MIN: Performed by: STUDENT IN AN ORGANIZED HEALTH CARE EDUCATION/TRAINING PROGRAM

## 2018-01-17 PROCEDURE — 27201423 OPTIME MED/SURG SUP & DEVICES STERILE SUPPLY: Performed by: STUDENT IN AN ORGANIZED HEALTH CARE EDUCATION/TRAINING PROGRAM

## 2018-01-17 PROCEDURE — 88304 TISSUE EXAM BY PATHOLOGIST: CPT | Mod: 26,,, | Performed by: PATHOLOGY

## 2018-01-17 PROCEDURE — 37000008 HC ANESTHESIA 1ST 15 MINUTES: Performed by: STUDENT IN AN ORGANIZED HEALTH CARE EDUCATION/TRAINING PROGRAM

## 2018-01-17 PROCEDURE — 63600175 PHARM REV CODE 636 W HCPCS: Performed by: STUDENT IN AN ORGANIZED HEALTH CARE EDUCATION/TRAINING PROGRAM

## 2018-01-17 PROCEDURE — 0FT44ZZ RESECTION OF GALLBLADDER, PERCUTANEOUS ENDOSCOPIC APPROACH: ICD-10-PCS | Performed by: STUDENT IN AN ORGANIZED HEALTH CARE EDUCATION/TRAINING PROGRAM

## 2018-01-17 PROCEDURE — 37000009 HC ANESTHESIA EA ADD 15 MINS: Performed by: STUDENT IN AN ORGANIZED HEALTH CARE EDUCATION/TRAINING PROGRAM

## 2018-01-17 PROCEDURE — 47563 LAPARO CHOLECYSTECTOMY/GRAPH: CPT | Mod: ,,, | Performed by: STUDENT IN AN ORGANIZED HEALTH CARE EDUCATION/TRAINING PROGRAM

## 2018-01-17 PROCEDURE — 88304 TISSUE EXAM BY PATHOLOGIST: CPT | Performed by: PATHOLOGY

## 2018-01-17 PROCEDURE — 36415 COLL VENOUS BLD VENIPUNCTURE: CPT

## 2018-01-17 PROCEDURE — S0020 INJECTION, BUPIVICAINE HYDRO: HCPCS | Performed by: STUDENT IN AN ORGANIZED HEALTH CARE EDUCATION/TRAINING PROGRAM

## 2018-01-17 PROCEDURE — 85025 COMPLETE CBC W/AUTO DIFF WBC: CPT

## 2018-01-17 PROCEDURE — C1894 INTRO/SHEATH, NON-LASER: HCPCS | Performed by: STUDENT IN AN ORGANIZED HEALTH CARE EDUCATION/TRAINING PROGRAM

## 2018-01-17 PROCEDURE — 99232 SBSQ HOSP IP/OBS MODERATE 35: CPT | Mod: ,,, | Performed by: INTERNAL MEDICINE

## 2018-01-17 PROCEDURE — BF131ZZ FLUOROSCOPY OF GALLBLADDER AND BILE DUCTS USING LOW OSMOLAR CONTRAST: ICD-10-PCS | Performed by: STUDENT IN AN ORGANIZED HEALTH CARE EDUCATION/TRAINING PROGRAM

## 2018-01-17 PROCEDURE — 94761 N-INVAS EAR/PLS OXIMETRY MLT: CPT

## 2018-01-17 PROCEDURE — 25000003 PHARM REV CODE 250: Performed by: STUDENT IN AN ORGANIZED HEALTH CARE EDUCATION/TRAINING PROGRAM

## 2018-01-17 PROCEDURE — 71000033 HC RECOVERY, INTIAL HOUR: Performed by: STUDENT IN AN ORGANIZED HEALTH CARE EDUCATION/TRAINING PROGRAM

## 2018-01-17 PROCEDURE — 74300 X-RAY BILE DUCTS/PANCREAS: CPT | Mod: 26,,, | Performed by: STUDENT IN AN ORGANIZED HEALTH CARE EDUCATION/TRAINING PROGRAM

## 2018-01-17 PROCEDURE — 36000708 HC OR TIME LEV III 1ST 15 MIN: Performed by: STUDENT IN AN ORGANIZED HEALTH CARE EDUCATION/TRAINING PROGRAM

## 2018-01-17 PROCEDURE — 25500020 PHARM REV CODE 255: Performed by: STUDENT IN AN ORGANIZED HEALTH CARE EDUCATION/TRAINING PROGRAM

## 2018-01-17 PROCEDURE — 11000001 HC ACUTE MED/SURG PRIVATE ROOM

## 2018-01-17 PROCEDURE — 80053 COMPREHEN METABOLIC PANEL: CPT

## 2018-01-17 RX ORDER — FENTANYL CITRATE 50 UG/ML
INJECTION, SOLUTION INTRAMUSCULAR; INTRAVENOUS
Status: DISCONTINUED | OUTPATIENT
Start: 2018-01-17 | End: 2018-01-17

## 2018-01-17 RX ORDER — KETOROLAC TROMETHAMINE 30 MG/ML
INJECTION, SOLUTION INTRAMUSCULAR; INTRAVENOUS
Status: DISCONTINUED | OUTPATIENT
Start: 2018-01-17 | End: 2018-01-17

## 2018-01-17 RX ORDER — SODIUM CHLORIDE, SODIUM LACTATE, POTASSIUM CHLORIDE, CALCIUM CHLORIDE 600; 310; 30; 20 MG/100ML; MG/100ML; MG/100ML; MG/100ML
INJECTION, SOLUTION INTRAVENOUS CONTINUOUS PRN
Status: DISCONTINUED | OUTPATIENT
Start: 2018-01-17 | End: 2018-01-17

## 2018-01-17 RX ORDER — HYDROMORPHONE HYDROCHLORIDE 2 MG/ML
0.2 INJECTION, SOLUTION INTRAMUSCULAR; INTRAVENOUS; SUBCUTANEOUS EVERY 5 MIN PRN
Status: DISCONTINUED | OUTPATIENT
Start: 2018-01-17 | End: 2018-01-18 | Stop reason: HOSPADM

## 2018-01-17 RX ORDER — HYDROCODONE BITARTRATE AND ACETAMINOPHEN 5; 325 MG/1; MG/1
1-2 TABLET ORAL
Qty: 45 TABLET | Refills: 0 | Status: SHIPPED | OUTPATIENT
Start: 2018-01-17

## 2018-01-17 RX ORDER — SODIUM CHLORIDE 0.9 % (FLUSH) 0.9 %
3 SYRINGE (ML) INJECTION
Status: DISCONTINUED | OUTPATIENT
Start: 2018-01-17 | End: 2018-01-18 | Stop reason: HOSPADM

## 2018-01-17 RX ORDER — ESMOLOL HYDROCHLORIDE 10 MG/ML
INJECTION INTRAVENOUS
Status: DISCONTINUED | OUTPATIENT
Start: 2018-01-17 | End: 2018-01-17

## 2018-01-17 RX ORDER — DEXAMETHASONE SODIUM PHOSPHATE 4 MG/ML
INJECTION, SOLUTION INTRA-ARTICULAR; INTRALESIONAL; INTRAMUSCULAR; INTRAVENOUS; SOFT TISSUE
Status: DISCONTINUED | OUTPATIENT
Start: 2018-01-17 | End: 2018-01-17

## 2018-01-17 RX ORDER — ACETAMINOPHEN 500 MG
500 TABLET ORAL EVERY 6 HOURS PRN
Status: DISCONTINUED | OUTPATIENT
Start: 2018-01-17 | End: 2018-01-18 | Stop reason: HOSPADM

## 2018-01-17 RX ORDER — ROCURONIUM BROMIDE 10 MG/ML
INJECTION, SOLUTION INTRAVENOUS
Status: DISCONTINUED | OUTPATIENT
Start: 2018-01-17 | End: 2018-01-17

## 2018-01-17 RX ORDER — PROPOFOL 10 MG/ML
VIAL (ML) INTRAVENOUS
Status: DISCONTINUED | OUTPATIENT
Start: 2018-01-17 | End: 2018-01-17

## 2018-01-17 RX ORDER — NEOSTIGMINE METHYLSULFATE 1 MG/ML
INJECTION, SOLUTION INTRAVENOUS
Status: DISCONTINUED | OUTPATIENT
Start: 2018-01-17 | End: 2018-01-17

## 2018-01-17 RX ORDER — GLYCOPYRROLATE 0.2 MG/ML
INJECTION INTRAMUSCULAR; INTRAVENOUS
Status: DISCONTINUED | OUTPATIENT
Start: 2018-01-17 | End: 2018-01-17

## 2018-01-17 RX ORDER — HYDROMORPHONE HYDROCHLORIDE 2 MG/ML
0.5 INJECTION, SOLUTION INTRAMUSCULAR; INTRAVENOUS; SUBCUTANEOUS EVERY 5 MIN PRN
Status: DISCONTINUED | OUTPATIENT
Start: 2018-01-17 | End: 2018-01-18 | Stop reason: HOSPADM

## 2018-01-17 RX ORDER — MIDAZOLAM HYDROCHLORIDE 1 MG/ML
INJECTION INTRAMUSCULAR; INTRAVENOUS
Status: DISCONTINUED | OUTPATIENT
Start: 2018-01-17 | End: 2018-01-17

## 2018-01-17 RX ORDER — PHENYLEPHRINE HYDROCHLORIDE 10 MG/ML
INJECTION INTRAVENOUS
Status: DISCONTINUED | OUTPATIENT
Start: 2018-01-17 | End: 2018-01-17

## 2018-01-17 RX ORDER — LIDOCAINE HCL/PF 100 MG/5ML
SYRINGE (ML) INTRAVENOUS
Status: DISCONTINUED | OUTPATIENT
Start: 2018-01-17 | End: 2018-01-17

## 2018-01-17 RX ORDER — BUPIVACAINE HYDROCHLORIDE 5 MG/ML
INJECTION, SOLUTION EPIDURAL; INTRACAUDAL
Status: DISCONTINUED | OUTPATIENT
Start: 2018-01-17 | End: 2018-01-17 | Stop reason: HOSPADM

## 2018-01-17 RX ORDER — SUCCINYLCHOLINE CHLORIDE 20 MG/ML
INJECTION INTRAMUSCULAR; INTRAVENOUS
Status: DISCONTINUED | OUTPATIENT
Start: 2018-01-17 | End: 2018-01-17

## 2018-01-17 RX ORDER — SIMETHICONE 125 MG
125 TABLET,CHEWABLE ORAL EVERY 6 HOURS PRN
Status: DISCONTINUED | OUTPATIENT
Start: 2018-01-17 | End: 2018-01-18 | Stop reason: HOSPADM

## 2018-01-17 RX ORDER — ONDANSETRON HYDROCHLORIDE 2 MG/ML
INJECTION, SOLUTION INTRAMUSCULAR; INTRAVENOUS
Status: DISCONTINUED | OUTPATIENT
Start: 2018-01-17 | End: 2018-01-17

## 2018-01-17 RX ADMIN — HYDROMORPHONE HYDROCHLORIDE 0.5 MG: 2 INJECTION INTRAMUSCULAR; INTRAVENOUS; SUBCUTANEOUS at 11:01

## 2018-01-17 RX ADMIN — PROPOFOL 200 MG: 10 INJECTION, EMULSION INTRAVENOUS at 08:01

## 2018-01-17 RX ADMIN — BUPIVACAINE HYDROCHLORIDE 30 ML: 5 INJECTION, SOLUTION EPIDURAL; INTRACAUDAL; PERINEURAL at 09:01

## 2018-01-17 RX ADMIN — PHENYLEPHRINE HYDROCHLORIDE 100 MCG: 10 INJECTION INTRAVENOUS at 10:01

## 2018-01-17 RX ADMIN — PHENYLEPHRINE HYDROCHLORIDE 100 MCG: 10 INJECTION INTRAVENOUS at 09:01

## 2018-01-17 RX ADMIN — HYDROCODONE BITARTRATE AND ACETAMINOPHEN 1 TABLET: 5; 325 TABLET ORAL at 11:01

## 2018-01-17 RX ADMIN — ESMOLOL HYDROCHLORIDE 20 MG: 10 INJECTION, SOLUTION INTRAVENOUS at 09:01

## 2018-01-17 RX ADMIN — ESMOLOL HYDROCHLORIDE 20 MG: 10 INJECTION, SOLUTION INTRAVENOUS at 10:01

## 2018-01-17 RX ADMIN — DEXAMETHASONE SODIUM PHOSPHATE 4 MG: 4 INJECTION, SOLUTION INTRAMUSCULAR; INTRAVENOUS at 09:01

## 2018-01-17 RX ADMIN — NEOSTIGMINE METHYLSULFATE 3 MG: 1 INJECTION INTRAVENOUS at 10:01

## 2018-01-17 RX ADMIN — ESMOLOL HYDROCHLORIDE 30 MG: 10 INJECTION, SOLUTION INTRAVENOUS at 08:01

## 2018-01-17 RX ADMIN — ONDANSETRON 4 MG: 2 INJECTION, SOLUTION INTRAMUSCULAR; INTRAVENOUS at 10:01

## 2018-01-17 RX ADMIN — HYDROCODONE BITARTRATE AND ACETAMINOPHEN 1 TABLET: 5; 325 TABLET ORAL at 04:01

## 2018-01-17 RX ADMIN — SIMETHICONE 125 MG: 125 TABLET, CHEWABLE ORAL at 08:01

## 2018-01-17 RX ADMIN — GLYCOPYRROLATE 0.6 MG: 0.2 INJECTION, SOLUTION INTRAMUSCULAR; INTRAVENOUS at 10:01

## 2018-01-17 RX ADMIN — LIDOCAINE HYDROCHLORIDE 80 MG: 20 INJECTION, SOLUTION INTRAVENOUS at 08:01

## 2018-01-17 RX ADMIN — FENTANYL CITRATE 25 MCG: 50 INJECTION, SOLUTION INTRAMUSCULAR; INTRAVENOUS at 09:01

## 2018-01-17 RX ADMIN — SODIUM CHLORIDE, SODIUM LACTATE, POTASSIUM CHLORIDE, AND CALCIUM CHLORIDE: .6; .31; .03; .02 INJECTION, SOLUTION INTRAVENOUS at 08:01

## 2018-01-17 RX ADMIN — FENTANYL CITRATE 25 MCG: 50 INJECTION, SOLUTION INTRAMUSCULAR; INTRAVENOUS at 10:01

## 2018-01-17 RX ADMIN — MIDAZOLAM HYDROCHLORIDE 2 MG: 1 INJECTION, SOLUTION INTRAMUSCULAR; INTRAVENOUS at 08:01

## 2018-01-17 RX ADMIN — KETOROLAC TROMETHAMINE 30 MG: 30 INJECTION, SOLUTION INTRAMUSCULAR; INTRAVENOUS at 10:01

## 2018-01-17 RX ADMIN — HYDROCODONE BITARTRATE AND ACETAMINOPHEN 1 TABLET: 5; 325 TABLET ORAL at 08:01

## 2018-01-17 RX ADMIN — SODIUM CHLORIDE, SODIUM LACTATE, POTASSIUM CHLORIDE, AND CALCIUM CHLORIDE: .6; .31; .03; .02 INJECTION, SOLUTION INTRAVENOUS at 10:01

## 2018-01-17 RX ADMIN — ROCURONIUM BROMIDE 30 MG: 10 INJECTION, SOLUTION INTRAVENOUS at 09:01

## 2018-01-17 RX ADMIN — DEXTROSE 2 G: 50 INJECTION, SOLUTION INTRAVENOUS at 09:01

## 2018-01-17 RX ADMIN — IOHEXOL 50 ML: 300 INJECTION, SOLUTION INTRAVENOUS at 10:01

## 2018-01-17 RX ADMIN — SUCCINYLCHOLINE CHLORIDE 120 MG: 20 INJECTION, SOLUTION INTRAMUSCULAR; INTRAVENOUS at 08:01

## 2018-01-17 NOTE — ASSESSMENT & PLAN NOTE
- s/p ERCP with sphincterotomy stone extraction 1/16  - clinically doing better  - lfts down  - f/u with us in 2 weeks  - no s/s of complications  - d/w family at bedside

## 2018-01-17 NOTE — SUBJECTIVE & OBJECTIVE
Subjective:     Interval History: Feels ok today, no n/v. Mild abd pain, better. Afebrile    Review of Systems   Constitutional: Negative for fever.   Cardiovascular: Negative for chest pain and leg swelling.   Gastrointestinal: Positive for abdominal pain.     Objective:     Vital Signs (Most Recent):  Temp: 97.3 °F (36.3 °C) (01/17/18 1225)  Pulse: 80 (01/17/18 1225)  Resp: 14 (01/17/18 1225)  BP: 137/80 (01/17/18 1225)  SpO2: 97 % (01/17/18 1226) Vital Signs (24h Range):  Temp:  [97.3 °F (36.3 °C)-98.6 °F (37 °C)] 97.3 °F (36.3 °C)  Pulse:  [66-83] 80  Resp:  [14-19] 14  SpO2:  [93 %-100 %] 97 %  BP: (118-140)/(56-81) 137/80     Weight: 60.2 kg (132 lb 11.5 oz) (01/17/18 0412)  Body mass index is 26.81 kg/m².      Intake/Output Summary (Last 24 hours) at 01/17/18 1525  Last data filed at 01/17/18 1103   Gross per 24 hour   Intake             2400 ml   Output                0 ml   Net             2400 ml       Lines/Drains/Airways     Peripheral Intravenous Line                 Peripheral IV - Single Lumen 01/15/18 0930 Right Hand 2 days                Physical Exam   Constitutional: She is oriented to person, place, and time. She appears well-developed and well-nourished. No distress.   Eyes: Conjunctivae are normal.   Neck: No tracheal deviation present.   Cardiovascular: Normal rate, regular rhythm and normal heart sounds.    Pulmonary/Chest: Effort normal and breath sounds normal. No respiratory distress.   Abdominal: Soft. Bowel sounds are normal. There is tenderness. There is no guarding.   Lymphadenopathy:     She has no cervical adenopathy.   Neurological: She is alert and oriented to person, place, and time.   Psychiatric: She has a normal mood and affect. Her behavior is normal. Thought content normal.   Nursing note and vitals reviewed.      Significant Labs:  CMP:   Recent Labs  Lab 01/17/18  0806   GLU 87   CALCIUM 9.3   ALBUMIN 3.4*   PROT 7.1      K 3.5   CO2 27      BUN 5*    CREATININE 0.6   ALKPHOS 403*   *   *   BILITOT 2.8*         Significant Imaging:  Imaging results within the past 24 hours have been reviewed.

## 2018-01-17 NOTE — PLAN OF CARE
"Problem: Patient Care Overview  Goal: Plan of Care Review  Outcome: Ongoing (interventions implemented as appropriate)  AAOx4, resting in bed, NAD, VSS.  C/o "little" pain to abdomen.  4 lap sites to abdomen CDI, 1 site with small amount of serosanguinous drainage.  Tolerating clear liquid diet, will advance as ordered.  Instructed to call for assistance.  Safety maintained.  Will continue to monitor.        "

## 2018-01-17 NOTE — PROGRESS NOTES
Follow-Up       Follow-up With  Details  Why  Contact Info   Edward Hull MD  On 2/1/2018  9:40 am For wound re-check  [address]  [address]  [place] [address]  [phone]   [facility]     will call you with a hospital follow up apt.   [address]  [address]  [place] [address]  [phone]

## 2018-01-17 NOTE — PROGRESS NOTES
[facility]  Gastroenterology  Progress Note    Patient Name: Radha Livingston  MRN: [id number]  Admission Date: 1/15/2018  Hospital Length of Stay: 0 days  Code Status: No Order   Attending Provider: Edward Hull MD  Consulting Provider: Nadege Zavala MD  Primary Care Physician: [facility]  Principal Problem: Cholelithiasis with acute cholecystitis with biliary obstruction      Subjective:     Interval History: Feels ok today, no n/v. Mild abd pain, better. Afebrile    Review of Systems   Constitutional: Negative for fever.   Cardiovascular: Negative for chest pain and leg swelling.   Gastrointestinal: Positive for abdominal pain.     Objective:     Vital Signs (Most Recent):  Temp: 97.3 °F (36.3 °C) (01/17/18 1225)  Pulse: 80 (01/17/18 1225)  Resp: 14 (01/17/18 1225)  BP: 137/80 (01/17/18 1225)  SpO2: 97 % (01/17/18 1226) Vital Signs (24h Range):  Temp:  [97.3 °F (36.3 °C)-98.6 °F (37 °C)] 97.3 °F (36.3 °C)  Pulse:  [66-83] 80  Resp:  [14-19] 14  SpO2:  [93 %-100 %] 97 %  BP: (118-140)/(56-81) 137/80     Weight: 60.2 kg (132 lb 11.5 oz) (01/17/18 0412)  Body mass index is 26.81 kg/m².      Intake/Output Summary (Last 24 hours) at 01/17/18 1525  Last data filed at 01/17/18 1103   Gross per 24 hour   Intake             2400 ml   Output                0 ml   Net             2400 ml       Lines/Drains/Airways     Peripheral Intravenous Line                 Peripheral IV - Single Lumen 01/15/18 0930 Right Hand 2 days                Physical Exam   Constitutional: She is oriented to person, place, and time. She appears well-developed and well-nourished. No distress.   Eyes: Conjunctivae are normal.   Neck: No tracheal deviation present.   Cardiovascular: Normal rate, regular rhythm and normal heart sounds.    Pulmonary/Chest: Effort normal and breath sounds normal. No respiratory distress.   Abdominal: Soft. Bowel sounds are normal. There is tenderness. There is no guarding.    Lymphadenopathy:     She has no cervical adenopathy.   Neurological: She is alert and oriented to person, place, and time.   Psychiatric: She has a normal mood and affect. Her behavior is normal. Thought content normal.   Nursing note and vitals reviewed.      Significant Labs:  CMP:   Recent Labs  Lab 01/17/18  0806   GLU 87   CALCIUM 9.3   ALBUMIN 3.4*   PROT 7.1      K 3.5   CO2 27      BUN 5*   CREATININE 0.6   ALKPHOS 403*   *   *   BILITOT 2.8*         Significant Imaging:  Imaging results within the past 24 hours have been reviewed.    Assessment/Plan:     * Cholelithiasis with acute cholecystitis with biliary obstruction    - s/p ERCP with sphincterotomy stone extraction 1/16  - clinically doing better  - lfts down  - f/u with us in 2 weeks  - no s/s of complications  - d/w family at bedside            Thank you for your consult. I will follow-up with patient. Please contact us if you have any additional questions.    Nadege Zavala MD  Gastroenterology  [facility]

## 2018-01-17 NOTE — OP NOTE
DATE OF SURGERY: 01/17/2018    PREOPERATIVE DIAGNOSIS: Biliary colic.    POSTOPERATIVE DIAGNOSIS: Chronic cholecystitis and choledocholithiasis.    PROCEDURE: Laparoscopic cholecystectomy with intraoperative cholangiogram.    SURGEON: Edward Hull M.D.    ASSISTANT: None.    ANESTHESIA: General endotracheal.    PREP: Chlorhexidine.    ESTIMATED BLOOD LOSS: Minimal.    SPECIMEN: Gallbladder.    INDICATIONS: The patient is a 57 y.o. female who presents to the ED with   signs and symptoms of recurrent biliary colic. She was found to have choledocholithiasis. GI was consulted. ERCP was performed with removal of common bile duct stone and stent placement. The patient was counseled on options  for treatment and desired surgical intervention. The risks of the surgery were  described to the patient including bleeding, infection, pain, scar and wound   complications, injury to local structures warranting more extensive surgery and   potential need for further intervention. The patient demonstrated understanding  of these risks and a consent form was obtained.      PROCEDURE: The patient was identified in Preoperative Unit and taken back to   the Operating Room, laid supine on the operating room table. IV antibiotics   were administered prior to induction of general anesthesia. General anesthesia   was induced without complication. The patient was then prepped and draped in   standard sterile fashion. A timeout procedure was performed according to the   hospital protocol. Local anesthesia was infiltrated into the skin and   subcutaneous tissues of the incision sites.     A 3 mm incision was made in the   supraumbilical location with an 11 blade scalpel. The tissues were dissected   until the fascia was encountered. The abdomen was then entered using a 5mm optical trocar. CO2   insufflation was initiated. The camera was inserted and the abdomen was   Inspected. There was evidence of chronic cholecystitis in the   right  upper quadrant. An 11-mm subxiphoid trocar was then placed under direct   visualization, followed by two 5-mm trocars in the right subcostal location.   The gallbladder was grasped and retracted into the right upper quadrant over the  liver. The infundibulum was identified in the cystic duct and artery were then  dissected until the critical view was obtained.     At this point, a clip was   placed on the distal cystic duct and the cholangiogram catheter was inserted   through a small ductotomy.  The catheter was clipped into place and   then a cholangiogram was performed. There was no opacification of the second-order   biliary tree due to placement of the stent, the common duct quickly filled and emptied into the duodenum.   At this point, the cholangiogram catheter was  removed. An endoloop was placed   on the proximal cystic duct and it was divided. Metallic clips were placed on the proximal   cystic artery and one Distally and then this structure was also divided. The   gallbladder was then removed from the liver fossa with Bovie cautery. In doing so  The gallbladder was entered and bilious fluid with sludge spilled into the right  Upper quadrant. This fluid was irrigated and evacuated.     Once the gallbladder was  Removed it was placed into EndoCatch bag and removed through the   umbilical port. At this point, the right upper quadrant was irrigated again and the   fluid was evacuated. Hemostasis was achieved and then confirmed. The ports   were then removed under direct visualization. The umbilical fascia was closed   using 0 Vicryl suture in a figure-of-eight fashion. The skin incisions were   closed using 5-0 Monocryl suture in interrupted fashion. Dry sterile dressings   were then applied. The patient was awakened from general anesthesia without   complication and returned to the Postoperative Recovery Unit in stable   condition. At the end of the case, sponge and needle counts were correct on 2    occasions. I was present and scrubbed throughout the entirety of the case.      COMPLICATIONS: None.    CONDITION: Stable.

## 2018-01-17 NOTE — TRANSFER OF CARE
"Anesthesia Transfer of Care Note    Patient: Radha Livingston    Procedure(s) Performed: Procedure(s) (LRB):  CHOLECYSTECTOMY-LAPAROSCOPIC W/ CHOLANGIOGRAM (N/A)    Patient location: PACU    Anesthesia Type: general    Transport from OR: Transported from OR on 6-10 L/min O2 by face mask with adequate spontaneous ventilation    Post pain: adequate analgesia    Post assessment: no apparent anesthetic complications and tolerated procedure well    Post vital signs: stable    Level of consciousness: awake, alert and oriented    Nausea/Vomiting: no nausea/vomiting    Complications: none    Transfer of care protocol was followed      Last vitals:   Visit Vitals  /79 (Patient Position: Lying)   Pulse 83   Temp 36.9 °C (98.5 °F) (Oral)   Resp 16   Ht 4' 11" (1.499 m)   Wt 60.2 kg (132 lb 11.5 oz)   SpO2 96%   Breastfeeding? No   BMI 26.81 kg/m²     "

## 2018-01-17 NOTE — PLAN OF CARE
Signed out from pacu per Dr Parks. Report called to Vira Botello/5A. Transported to room 504 via stretcher,sr upx2.

## 2018-01-17 NOTE — DISCHARGE SUMMARY
[facility]  General Surgery  Discharge Summary      Patient Name: Radha Livingston  MRN: [id number]  Admission Date: 1/15/2018  Hospital Length of Stay: 0 days  Discharge Date and Time:  01/17/2018 12:24 PM  Attending Physician: Edward Hull MD   Discharging Provider: PETER Hsieh Jr., MD  Primary Care Provider: [facility]     HPI: 57F presented to ED with RUQ pain for 3 days. Pain is severe, constant. She had a minor episode of similar type pain 6 months ago that resolved shortly after it began. Denies fever.  Some nausea but no vomiting. Regular BMs.      Procedure(s) (LRB):  CHOLECYSTECTOMY-LAPAROSCOPIC W/ CHOLANGIOGRAM (N/A)     Hospital Course: Found to have evidence of CBD stone on US and labs.  Despite observation for 24 hours, stone did not pass.  GI was consulted, who performed ERC, sphincterotomy, stone removal, and stent placement.  The following day, she was taken for lap cholecystectomy with IOC.  IOC showed no evidence of residual CBD stone.    The patient tolerated the procedure well and subsequently had a benign hospital course.  Pt was started on a surgically progressive diet, which she tolerated well.  At this time, her pain is controlled with oral pain medication, she is ambulating well, and is able to urinate on her own.  Patient stable for discharge on POD 1.      Consults:   Consults         Status Ordering Provider     Inpatient consult [facility]     Provider:  MD Gayla Gamez C. IVAN JR        Pending Diagnostic Studies:     None        Final Active Diagnoses:    Diagnosis Date Noted POA    PRINCIPAL PROBLEM:  Cholelithiasis with acute cholecystitis with biliary obstruction [K80.01] 01/15/2018 Yes    Cholecystitis [K81.9] 01/17/2018 Yes    Abdominal pain [R10.9] 01/15/2018 Yes      Problems Resolved During this Admission:    Diagnosis Date Noted Date Resolved POA      Discharged Condition:  good    Disposition: Home or Self Care    Follow Up:  Follow-up Information     Edward Hull MD In 2 weeks.    Specialties:  Surgery, General Surgery  Why:  For wound re-check  Contact information:  [address]  [address]  [place] [address] [phone]                 Patient Instructions:     Activity as tolerated     Shower on day dressing removed (No bath)     Notify your health care provider if you experience any of the following:  increased confusion or weakness     Notify your health care provider if you experience any of the following:  persistent dizziness, light-headedness, or visual disturbances     Notify your health care provider if you experience any of the following:  worsening rash     Notify your health care provider if you experience any of the following:  severe persistent headache     Notify your health care provider if you experience any of the following:  difficulty breathing or increased cough     Notify your health care provider if you experience any of the following:  redness, tenderness, or signs of infection (pain, swelling, redness, odor or green/yellow discharge around incision site)     Notify your health care provider if you experience any of the following:  severe uncontrolled pain     Notify your health care provider if you experience any of the following:  persistent nausea and vomiting or diarrhea     Notify your health care provider if you experience any of the following:  temperature >100.4     Remove dressing in 48 hours     Lifting restrictions   Order Comments: Do not lift anything greater than 10-15 lbs for 2 weeks       Medications:  Reconciled Home Medications:   Current Discharge Medication List      START taking these medications    Details   hydrocodone-acetaminophen 5-325mg (NORCO) 5-325 mg per tablet Take 1-2 tablets by mouth every 4 to 6 hours as needed.  Qty: 45 tablet, Refills: 0             C Herb Hsieh Jr., MD  General Surgery  [facility]

## 2018-01-17 NOTE — PLAN OF CARE
Problem: Patient Care Overview  Goal: Plan of Care Review  Outcome: Ongoing (interventions implemented as appropriate)  Pt is AAOx3. Polish speaking only, but family at bedside to translate. No complaints of nausea, vomiting, or diarrhea. No complaints of pain. NPO for sx in the am. Preop bath completed. Safety precautions maintained. Tolerated all medications well.

## 2018-01-17 NOTE — ANESTHESIA POSTPROCEDURE EVALUATION
"Anesthesia Post Evaluation    Patient: Germencia Livingston    Procedure(s) Performed: Procedure(s) (LRB):  CHOLECYSTECTOMY-LAPAROSCOPIC W/ CHOLANGIOGRAM (N/A)    Final Anesthesia Type: general  Patient location during evaluation: PACU  Patient participation: Yes- Able to Participate  Level of consciousness: awake and alert, oriented and awake  Post-procedure vital signs: reviewed and stable  Pain management: adequate  Airway patency: patent  PONV status at discharge: No PONV  Anesthetic complications: no      Cardiovascular status: blood pressure returned to baseline  Respiratory status: unassisted and room air  Hydration status: euvolemic  Follow-up not needed.        Visit Vitals  /80 (Patient Position: Lying)   Pulse 80   Temp 36.3 °C (97.3 °F) (Oral)   Resp 14   Ht 4' 11" (1.499 m)   Wt 60.2 kg (132 lb 11.5 oz)   SpO2 97%   Breastfeeding? No   BMI 26.81 kg/m²       Pain/Natividad Score: Pain Assessment Performed: Yes (1/17/2018 11:55 AM)  Presence of Pain: complains of pain/discomfort (1/17/2018 11:55 AM)  Pain Rating Prior to Med Admin: 4 (1/17/2018 11:55 AM)  Pain Rating Post Med Admin: 4 (1/17/2018 11:55 AM)  Natividad Score: 9 (1/17/2018 11:55 AM)      "

## 2018-01-17 NOTE — PLAN OF CARE
TN met with pt and family including English speaking daughter Chema [phone]   explained importance of keeping f/u apt with surgeon     TN will contact pt tomorrow with f/u apt with [facility] - pt has been there in the past and has seen  Mr. Dubose.           01/17/18 1400   Final Note   Assessment Type Final Discharge Note   Discharge Disposition Home   What phone number can be called within the next 1-3 days to see how you are doing after discharge? [phone]   Hospital Follow Up  Appt(s) scheduled? Yes   Discharge plans and expectations educations in teach back method with documentation complete? Yes   Right Care Referral Info   Post Acute Recommendation No Care   Referral Type (no care )

## 2018-01-17 NOTE — PROGRESS NOTES
Follow-Up       Follow-up With  Details  Why  Contact Info   Edward Hull MD  On 2/1/2018  9:40 am For wound re-check  [address]  [address]  [place] [address] [phone]

## 2018-01-18 VITALS
HEART RATE: 68 BPM | DIASTOLIC BLOOD PRESSURE: 69 MMHG | SYSTOLIC BLOOD PRESSURE: 125 MMHG | OXYGEN SATURATION: 95 % | HEIGHT: 59 IN | TEMPERATURE: 98 F | BODY MASS INDEX: 27.33 KG/M2 | RESPIRATION RATE: 18 BRPM | WEIGHT: 135.56 LBS

## 2018-01-18 LAB
ALBUMIN SERPL BCP-MCNC: 3.1 G/DL
ALP SERPL-CCNC: 324 U/L
ALT SERPL W/O P-5'-P-CCNC: 531 U/L
ANION GAP SERPL CALC-SCNC: 9 MMOL/L
AST SERPL-CCNC: 175 U/L
BASOPHILS # BLD AUTO: 0.02 K/UL
BASOPHILS NFR BLD: 0.3 %
BILIRUB SERPL-MCNC: 1.1 MG/DL
BUN SERPL-MCNC: 7 MG/DL
CALCIUM SERPL-MCNC: 9.1 MG/DL
CHLORIDE SERPL-SCNC: 103 MMOL/L
CO2 SERPL-SCNC: 26 MMOL/L
CREAT SERPL-MCNC: 0.6 MG/DL
DIFFERENTIAL METHOD: ABNORMAL
EOSINOPHIL # BLD AUTO: 0.1 K/UL
EOSINOPHIL NFR BLD: 1.8 %
ERYTHROCYTE [DISTWIDTH] IN BLOOD BY AUTOMATED COUNT: 13.4 %
EST. GFR  (AFRICAN AMERICAN): >60 ML/MIN/1.73 M^2
EST. GFR  (NON AFRICAN AMERICAN): >60 ML/MIN/1.73 M^2
GLUCOSE SERPL-MCNC: 114 MG/DL
HCT VFR BLD AUTO: 36.1 %
HGB BLD-MCNC: 11.8 G/DL
LYMPHOCYTES # BLD AUTO: 1.6 K/UL
LYMPHOCYTES NFR BLD: 23.3 %
MCH RBC QN AUTO: 27.7 PG
MCHC RBC AUTO-ENTMCNC: 32.7 G/DL
MCV RBC AUTO: 85 FL
MONOCYTES # BLD AUTO: 0.6 K/UL
MONOCYTES NFR BLD: 9.1 %
NEUTROPHILS # BLD AUTO: 4.4 K/UL
NEUTROPHILS NFR BLD: 65.4 %
PLATELET # BLD AUTO: 263 K/UL
PMV BLD AUTO: 10.9 FL
POTASSIUM SERPL-SCNC: 3.2 MMOL/L
PROT SERPL-MCNC: 6.5 G/DL
RBC # BLD AUTO: 4.26 M/UL
SODIUM SERPL-SCNC: 138 MMOL/L
WBC # BLD AUTO: 6.78 K/UL

## 2018-01-18 PROCEDURE — 80053 COMPREHEN METABOLIC PANEL: CPT

## 2018-01-18 PROCEDURE — 94761 N-INVAS EAR/PLS OXIMETRY MLT: CPT

## 2018-01-18 PROCEDURE — 85025 COMPLETE CBC W/AUTO DIFF WBC: CPT

## 2018-01-18 PROCEDURE — 99232 SBSQ HOSP IP/OBS MODERATE 35: CPT | Mod: ,,, | Performed by: INTERNAL MEDICINE

## 2018-01-18 PROCEDURE — 25000003 PHARM REV CODE 250: Performed by: STUDENT IN AN ORGANIZED HEALTH CARE EDUCATION/TRAINING PROGRAM

## 2018-01-18 PROCEDURE — 36415 COLL VENOUS BLD VENIPUNCTURE: CPT

## 2018-01-18 RX ADMIN — SIMETHICONE 125 MG: 125 TABLET, CHEWABLE ORAL at 09:01

## 2018-01-18 RX ADMIN — HYDROCODONE BITARTRATE AND ACETAMINOPHEN 1 TABLET: 5; 325 TABLET ORAL at 03:01

## 2018-01-18 RX ADMIN — HYDROCODONE BITARTRATE AND ACETAMINOPHEN 1 TABLET: 5; 325 TABLET ORAL at 09:01

## 2018-01-18 NOTE — PLAN OF CARE
TN spoke with pt's daughter Chema  [phone] prior to d/c   advised of upcoming apts      [place] [facility]   Mon   1/22/18  at 10 am -  Mr. Dubose       Future Appointments  Date Time Provider Department Center   2/1/2018 9:40 AM Edward Hull MD [facility] [place] [facility]        01/18/18 1156   Final Note   Assessment Type Final Discharge Note   Discharge Disposition Home   What phone number can be called within the next 1-3 days to see how you are doing after discharge? [phone]   Hospital Follow Up  Appt(s) scheduled? Yes   Discharge plans and expectations educations in teach back method with documentation complete? Yes   Right Care Referral Info   Post Acute Recommendation No Care   Referral Type (no care )

## 2018-01-18 NOTE — PROGRESS NOTES
[facility]  Gastroenterology  Progress Note    Patient Name: Radha Livingston  MRN: [id number]  Admission Date: 1/15/2018  Hospital Length of Stay: 1 days  Code Status: No Order   Attending Provider: Edward Hull MD  Consulting Provider: Nadege Zavala MD  Primary Care Physician: [facility]  Principal Problem: Cholelithiasis with acute cholecystitis with biliary obstruction      Subjective:     Interval History: Feels better, ten diet. Family at bedside. Afebrile.     Review of Systems   Cardiovascular: Negative for chest pain and leg swelling.   Gastrointestinal: Negative for abdominal pain, anal bleeding and nausea.   Musculoskeletal: Negative for arthralgias.     Objective:     Vital Signs (Most Recent):  Temp: 98.2 °F (36.8 °C) (01/18/18 1129)  Pulse: 68 (01/18/18 1129)  Resp: 18 (01/18/18 1129)  BP: 125/69 (01/18/18 1129)  SpO2: 95 % (01/18/18 1129) Vital Signs (24h Range):  Temp:  [98.1 °F (36.7 °C)-99.7 °F (37.6 °C)] 98.2 °F (36.8 °C)  Pulse:  [67-80] 68  Resp:  [16-18] 18  SpO2:  [94 %-96 %] 95 %  BP: (115-130)/(69-76) 125/69     Weight: 61.5 kg (135 lb 9.3 oz) (01/18/18 0325)  Body mass index is 27.38 kg/m².      Intake/Output Summary (Last 24 hours) at 01/18/18 1318  Last data filed at 01/18/18 0900   Gross per 24 hour   Intake              305 ml   Output                0 ml   Net              305 ml       Lines/Drains/Airways          No matching active lines, drains, or airways          Physical Exam   Constitutional: She is oriented to person, place, and time. She appears well-developed and well-nourished. No distress.   HENT:   Head: Normocephalic and atraumatic.   Eyes: Conjunctivae are normal. No scleral icterus.   Cardiovascular: Normal rate and regular rhythm.    No murmur heard.  Pulmonary/Chest: Effort normal and breath sounds normal. No respiratory distress.   Abdominal: Soft. Bowel sounds are normal. She exhibits no distension.   Neurological: She is  alert and oriented to person, place, and time. No cranial nerve deficit.   Skin: Skin is warm and dry. No erythema.   Psychiatric: She has a normal mood and affect. Her behavior is normal.   Nursing note and vitals reviewed.      Significant Labs:  CMP:   Recent Labs  Lab 01/18/18  0854   *   CALCIUM 9.1   ALBUMIN 3.1*   PROT 6.5      K 3.2*   CO2 26      BUN 7   CREATININE 0.6   ALKPHOS 324*   *   *   BILITOT 1.1*         Significant Imaging:  Imaging results within the past 24 hours have been reviewed.    Assessment/Plan:     No new Assessment & Plan notes have been filed under this hospital service since the last note was generated.  Service: Gastroenterology      Thank you for your consult. I will sign off. Please contact us if you have any additional questions.    Nadege Zavala MD  Gastroenterology  [facility]

## 2018-01-18 NOTE — PLAN OF CARE
Problem: Patient Care Overview  Goal: Plan of Care Review  Outcome: Ongoing (interventions implemented as appropriate)  Pt is AAOx3. No complaints of nausea, vomiting, or diarrhea. Pt complained of abd and gas pain. Regular diet. Ambulated with assist. Lap sites are intact. Safety precautions maintained. Tolerated all medications well.

## 2018-01-18 NOTE — PLAN OF CARE
Problem: Patient Care Overview  Goal: Plan of Care Review  Outcome: Outcome(s) achieved Date Met: 01/18/18  Pt ambulated in room. NAD noted Discharge instructions given in Faroese via Ami CHAVEZ  Questions answered and pt/fly voice understanding

## 2018-01-18 NOTE — ASSESSMENT & PLAN NOTE
- s/p ERCP with sphincterotomy stone extraction 1/16  - clinically continuing to improve   - lfts downtrending  - ok to d/c from my standpoint  - no s/s of complications  - d/w family at bedside

## 2018-01-18 NOTE — PROGRESS NOTES
Pt tolerating full liquids, advanced to regular diet.  Pt and family stated that they would like to stay overnight.

## 2018-01-18 NOTE — NURSING
"Assisted with patient ambulation--50 feet in room  Reports "gas " but no BM as of yet. Tolerated activity well. Up in chair with call bell within reach. Family attentive at bedside  "

## 2018-01-18 NOTE — SUBJECTIVE & OBJECTIVE
Subjective:     Interval History: Feels better, ten diet. Family at bedside. Afebrile.     Review of Systems   Cardiovascular: Negative for chest pain and leg swelling.   Gastrointestinal: Negative for abdominal pain, anal bleeding and nausea.   Musculoskeletal: Negative for arthralgias.     Objective:     Vital Signs (Most Recent):  Temp: 98.2 °F (36.8 °C) (01/18/18 1129)  Pulse: 68 (01/18/18 1129)  Resp: 18 (01/18/18 1129)  BP: 125/69 (01/18/18 1129)  SpO2: 95 % (01/18/18 1129) Vital Signs (24h Range):  Temp:  [98.1 °F (36.7 °C)-99.7 °F (37.6 °C)] 98.2 °F (36.8 °C)  Pulse:  [67-80] 68  Resp:  [16-18] 18  SpO2:  [94 %-96 %] 95 %  BP: (115-130)/(69-76) 125/69     Weight: 61.5 kg (135 lb 9.3 oz) (01/18/18 0325)  Body mass index is 27.38 kg/m².      Intake/Output Summary (Last 24 hours) at 01/18/18 1318  Last data filed at 01/18/18 0900   Gross per 24 hour   Intake              305 ml   Output                0 ml   Net              305 ml       Lines/Drains/Airways          No matching active lines, drains, or airways          Physical Exam   Constitutional: She is oriented to person, place, and time. She appears well-developed and well-nourished. No distress.   HENT:   Head: Normocephalic and atraumatic.   Eyes: Conjunctivae are normal. No scleral icterus.   Cardiovascular: Normal rate and regular rhythm.    No murmur heard.  Pulmonary/Chest: Effort normal and breath sounds normal. No respiratory distress.   Abdominal: Soft. Bowel sounds are normal. She exhibits no distension.   Neurological: She is alert and oriented to person, place, and time. No cranial nerve deficit.   Skin: Skin is warm and dry. No erythema.   Psychiatric: She has a normal mood and affect. Her behavior is normal.   Nursing note and vitals reviewed.      Significant Labs:  CMP:   Recent Labs  Lab 01/18/18  0854   *   CALCIUM 9.1   ALBUMIN 3.1*   PROT 6.5      K 3.2*   CO2 26      BUN 7   CREATININE 0.6   ALKPHOS 324*   ALT  531*   *   BILITOT 1.1*         Significant Imaging:  Imaging results within the past 24 hours have been reviewed.

## 2018-01-22 ENCOUNTER — PATIENT OUTREACH (OUTPATIENT)
Dept: ADMINISTRATIVE | Facility: CLINIC | Age: 58
End: 2018-01-22

## 2018-01-22 NOTE — PATIENT INSTRUCTIONS
Colecistectomía     Se utilizan clips para cerrar el conducto que conecta la vesícula con el conducto biliar y luego se extrae la vesícula.     Usted ha tenido ataques de dolor causados por cálculos biliares (piedras en la vesícula). Para tratar maeve problema, el proveedor de atención médica le quitará la vesícula biliar mediante tariq operación llamada colecistectomía. La extracción de la vesícula biliar puede aliviar el dolor y prevenir ataques en el futuro. Usted podrá llevar tariq chance yancy sin ward vesícula biliar. También es posible que pueda volver a comer las cosas que le gustaban antes de que empezaran mervin problemas de vesícula.  Antes de la operación  Prepárese así:  · Informe a ward proveedor de atención médica de todos los medicamentos que brenton (con o sin receta) sin olvidar las hierbas medicinales y los suplementos. Asegúrese de mencionar si está tomando anticoagulantes con receta soni Coumadin (warfarina), Plavix (clopidogrel) y aspirina.  · Hágase todas las pruebas que le pida ward proveedor, soni análisis de raul.  · No coma ni bryn nada, ni siquiera agua, café o caramelos de menta, a partir de la medianoche anterior a la operación. No obstante, puede que sí necesite bebeto algún tipo de medicamento con sorbos de agua: pregunte a ward proveedor de atención médica.  El día de la operación  Cuando llegue al hospital se preparará para la cirugía.  · Le pondrán tariq sonda intravenosa (IV) en un brazo o en tariq mano para suministrarle líquidos y medicamentos.  · Un anestesiólogo hablará con usted acerca de la anestesia para evitar el dolor. Le darán anestesia general, con lo cual usted estará profundamente dormido sim todo el procedimiento.  Sim la operación  Existen dos métodos para extraer la vesícula biliar. El proveedor de atención médic\a elegirá el método que sea mejor para ward rosario.  · Colecistectomía laparoscópica. Maeve es el procedimiento más común. Sim la cirugía se hacen entre dos y cuatro  incisiones pequeñas. A través de tariq de estas incisiones se introduce un tubo reynolds, llamado laparoscopio, provisto de tariq cámara que envía imágenes a tariq pantalla de video. A través de las otras incisiones se introducen instrumentos quirúrgicos. La vesícula biliar se extrae usando el laparoscopio y estos instrumentos.  · Colecistectomía abierta. Se realiza tariq incisión más jack, a través de la cual el médico observa la vesícula y lleva a cabo la operación. La cirugía abierta se usa sobre todo cuando existen cicatrices u otros factores que la convierten en tariq opción más adecuada para usted.  En algunos casos puede ser necesario cambiar de cirugía laparoscópica a cirugía abierta sim el procedimiento por motivos de seguridad.  Después de la operación  Lo trasladarán a tariq habitación hasta que se despierte de la anestesia. Lo más probable es que usted regrese a casa el mismo día. En algunos casos puede ser necesario permanecer en el hospital hasta el día siguiente. Si le hicieron tariq colecistectomía abierta, quizás necesite pasar varios días en el hospital. Pídale a un familiar o a un amigo adulto que lo conduzca a ward casa cuando lo den de lucinda.  Riesgos y posibles complicaciones de la cirugía de la vesícula biliar  Todas las cirugías conllevan ciertos riesgos. Algunos de los riesgos de la cirugía de la vesícula biliar son:  · Sangrado  · Infección  · Lesión del conducto biliar común o los órganos cercanos  · Coágulos de raul en las piernas  · Escape de bilis  · Hernia en el lugar de la incisión  · Neumonía   Date Last Reviewed: 3/27/2014  © 8923-2427 [employer]. [address] [place], [place] [address], [place] [address]. Todos los derechos reservados. Esta información no pretende sustituir la atención médica profesional. Sólo ward médico puede diagnosticar y tratar un problema de silvia.

## 2018-02-01 ENCOUNTER — OFFICE VISIT (OUTPATIENT)
Dept: SURGERY | Facility: CLINIC | Age: 58
End: 2018-02-01

## 2018-02-01 VITALS
BODY MASS INDEX: 27.71 KG/M2 | WEIGHT: 137.44 LBS | HEART RATE: 88 BPM | DIASTOLIC BLOOD PRESSURE: 84 MMHG | SYSTOLIC BLOOD PRESSURE: 134 MMHG | HEIGHT: 59 IN | TEMPERATURE: 98 F

## 2018-02-01 DIAGNOSIS — K80.63 CALCULUS OF GALLBLADDER AND BILE DUCT WITH ACUTE CHOLECYSTITIS, WITH OBSTRUCTION: Primary | ICD-10-CM

## 2018-02-01 PROCEDURE — 99024 POSTOP FOLLOW-UP VISIT: CPT | Mod: ,,, | Performed by: STUDENT IN AN ORGANIZED HEALTH CARE EDUCATION/TRAINING PROGRAM

## 2018-02-01 PROCEDURE — 99213 OFFICE O/P EST LOW 20 MIN: CPT | Mod: PBBFAC,PO | Performed by: STUDENT IN AN ORGANIZED HEALTH CARE EDUCATION/TRAINING PROGRAM

## 2018-02-01 PROCEDURE — 99999 PR PBB SHADOW E&M-EST. PATIENT-LVL III: CPT | Mod: PBBFAC,,, | Performed by: STUDENT IN AN ORGANIZED HEALTH CARE EDUCATION/TRAINING PROGRAM

## 2018-02-01 NOTE — PROGRESS NOTES
Feeling well, much better than before surgery  No NV   Eating well  Some discomfort RUQ but improving  Having BMs  No postprandial pain  No fevers    Ab soft NT ND  Incisions well healed    Path = cholelithiasis, chronic cholecystitis    RTC prn  Resume normal activity

## 2018-02-28 ENCOUNTER — TELEPHONE (OUTPATIENT)
Dept: GASTROENTEROLOGY | Facility: CLINIC | Age: 58
End: 2018-02-28

## 2018-02-28 DIAGNOSIS — K80.50 COMMON BILE DUCT STONE: Primary | ICD-10-CM

## 2018-03-27 ENCOUNTER — ANESTHESIA EVENT (OUTPATIENT)
Dept: ENDOSCOPY | Facility: HOSPITAL | Age: 58
End: 2018-03-27

## 2018-03-28 ENCOUNTER — SURGERY (OUTPATIENT)
Age: 58
End: 2018-03-28

## 2018-03-28 ENCOUNTER — HOSPITAL ENCOUNTER (OUTPATIENT)
Facility: HOSPITAL | Age: 58
Discharge: HOME | End: 2018-03-28
Attending: INTERNAL MEDICINE | Admitting: INTERNAL MEDICINE

## 2018-03-28 ENCOUNTER — HOSPITAL ENCOUNTER (OUTPATIENT)
Dept: RADIOLOGY | Facility: HOSPITAL | Age: 58
Discharge: HOME | End: 2018-03-28
Attending: INTERNAL MEDICINE | Admitting: INTERNAL MEDICINE

## 2018-03-28 ENCOUNTER — ANESTHESIA (OUTPATIENT)
Dept: ENDOSCOPY | Facility: HOSPITAL | Age: 58
End: 2018-03-28

## 2018-03-28 VITALS
SYSTOLIC BLOOD PRESSURE: 114 MMHG | RESPIRATION RATE: 18 BRPM | HEIGHT: 58 IN | DIASTOLIC BLOOD PRESSURE: 83 MMHG | TEMPERATURE: 99 F | BODY MASS INDEX: 28.34 KG/M2 | WEIGHT: 135 LBS | HEART RATE: 75 BPM | OXYGEN SATURATION: 100 %

## 2018-03-28 DIAGNOSIS — K80.50 CHOLEDOCHOLITHIASIS: ICD-10-CM

## 2018-03-28 PROCEDURE — 74330 X-RAY BILE/PANC ENDOSCOPY: CPT | Mod: 26,,, | Performed by: RADIOLOGY

## 2018-03-28 PROCEDURE — 63600175 PHARM REV CODE 636 W HCPCS: Performed by: NURSE ANESTHETIST, CERTIFIED REGISTERED

## 2018-03-28 PROCEDURE — 74330 X-RAY BILE/PANC ENDOSCOPY: CPT | Mod: TC

## 2018-03-28 PROCEDURE — 43275 ERCP REMOVE FORGN BODY DUCT: CPT | Performed by: INTERNAL MEDICINE

## 2018-03-28 PROCEDURE — 43264 ERCP REMOVE DUCT CALCULI: CPT | Performed by: INTERNAL MEDICINE

## 2018-03-28 PROCEDURE — 74328 X-RAY BILE DUCT ENDOSCOPY: CPT | Mod: 26,,, | Performed by: INTERNAL MEDICINE

## 2018-03-28 PROCEDURE — 37000008 HC ANESTHESIA 1ST 15 MINUTES: Performed by: INTERNAL MEDICINE

## 2018-03-28 PROCEDURE — 25000003 PHARM REV CODE 250: Performed by: NURSE ANESTHETIST, CERTIFIED REGISTERED

## 2018-03-28 PROCEDURE — C1769 GUIDE WIRE: HCPCS | Performed by: INTERNAL MEDICINE

## 2018-03-28 PROCEDURE — 43275 ERCP REMOVE FORGN BODY DUCT: CPT | Mod: ,,, | Performed by: INTERNAL MEDICINE

## 2018-03-28 PROCEDURE — 27202125 HC BALLOON, EXTRACTION (ANY): Performed by: INTERNAL MEDICINE

## 2018-03-28 PROCEDURE — 37000009 HC ANESTHESIA EA ADD 15 MINS: Performed by: INTERNAL MEDICINE

## 2018-03-28 PROCEDURE — 25000003 PHARM REV CODE 250: Performed by: INTERNAL MEDICINE

## 2018-03-28 RX ORDER — PROPOFOL 10 MG/ML
VIAL (ML) INTRAVENOUS CONTINUOUS PRN
Status: DISCONTINUED | OUTPATIENT
Start: 2018-03-28 | End: 2018-03-28

## 2018-03-28 RX ORDER — PROPOFOL 10 MG/ML
VIAL (ML) INTRAVENOUS
Status: DISCONTINUED | OUTPATIENT
Start: 2018-03-28 | End: 2018-03-28

## 2018-03-28 RX ORDER — SODIUM CHLORIDE 9 MG/ML
INJECTION, SOLUTION INTRAVENOUS CONTINUOUS
Status: DISCONTINUED | OUTPATIENT
Start: 2018-03-28 | End: 2018-03-28 | Stop reason: HOSPADM

## 2018-03-28 RX ORDER — MIDAZOLAM HYDROCHLORIDE 1 MG/ML
INJECTION, SOLUTION INTRAMUSCULAR; INTRAVENOUS
Status: DISCONTINUED | OUTPATIENT
Start: 2018-03-28 | End: 2018-03-28

## 2018-03-28 RX ORDER — FENTANYL CITRATE 50 UG/ML
INJECTION, SOLUTION INTRAMUSCULAR; INTRAVENOUS
Status: DISCONTINUED | OUTPATIENT
Start: 2018-03-28 | End: 2018-03-28

## 2018-03-28 RX ORDER — LIDOCAINE HCL/PF 100 MG/5ML
SYRINGE (ML) INTRAVENOUS
Status: DISCONTINUED | OUTPATIENT
Start: 2018-03-28 | End: 2018-03-28

## 2018-03-28 RX ADMIN — SODIUM CHLORIDE: 0.9 INJECTION, SOLUTION INTRAVENOUS at 07:03

## 2018-03-28 RX ADMIN — PROPOFOL 40 MG: 10 INJECTION, EMULSION INTRAVENOUS at 08:03

## 2018-03-28 RX ADMIN — PROPOFOL 150 MCG/KG/MIN: 10 INJECTION, EMULSION INTRAVENOUS at 08:03

## 2018-03-28 RX ADMIN — FENTANYL CITRATE 25 MCG: 50 INJECTION, SOLUTION INTRAMUSCULAR; INTRAVENOUS at 08:03

## 2018-03-28 RX ADMIN — MIDAZOLAM 2 MG: 1 INJECTION INTRAMUSCULAR; INTRAVENOUS at 07:03

## 2018-03-28 RX ADMIN — LIDOCAINE HYDROCHLORIDE 50 MG: 20 INJECTION, SOLUTION INTRAVENOUS at 08:03

## 2018-03-28 RX ADMIN — TOPICAL ANESTHETIC 1 EACH: 200 SPRAY DENTAL; PERIODONTAL at 08:03

## 2018-03-28 NOTE — ANESTHESIA POSTPROCEDURE EVALUATION
"Anesthesia Post Evaluation    Patient: Radha Livingston    Procedure(s) Performed: Procedure(s) (LRB):  ERCP (N/A)    Final Anesthesia Type: MAC  Patient location during evaluation: GI PACU  Patient participation: Yes- Able to Participate  Level of consciousness: awake and alert and oriented  Post-procedure vital signs: reviewed and stable  Pain management: adequate  Airway patency: patent  PONV status at discharge: No PONV  Anesthetic complications: no      Cardiovascular status: blood pressure returned to baseline and hemodynamically stable  Respiratory status: unassisted, spontaneous ventilation and room air  Hydration status: euvolemic  Follow-up not needed.        Visit Vitals  /86 (Patient Position: Lying)   Pulse 100   Temp 36.9 °C (98.5 °F) (Oral)   Resp 18   Ht 4' 10" (1.473 m)   Wt 61.2 kg (135 lb)   SpO2 97%   Breastfeeding? No   BMI 28.22 kg/m²       Pain/Natividad Score: Pain Assessment Performed: Yes (3/28/2018  7:32 AM)  Presence of Pain: denies (3/28/2018  7:32 AM)  Pain Rating Prior to Med Admin: 0 (3/28/2018  7:32 AM)  Pain Rating Post Med Admin: 0 (3/28/2018  7:32 AM)      "

## 2018-03-28 NOTE — ANESTHESIA PREPROCEDURE EVALUATION
03/28/2018  Radha Livingston is a 57 y.o., female for ERCP under GA. Had lap rupali under GA and   ERCP 1/15/18, NKDA, speaks only [place].      ETT note: bougie intubation by resident 1/17/18, easy mask, anterior larynx, MAC 3 DL2 Grade 2 view, 7.5 at 22cm    Pre-op Assessment    I have reviewed the Patient Summary Reports.     I have reviewed the Nursing Notes.      Review of Systems  Anesthesia Hx:  No problems with previous Anesthesia Denies Hx of Anesthetic complications  History of prior surgery of interest to airway management or planning:  Denies Personal Hx of Anesthesia complications.   Social:  Non-Smoker, No Alcohol Use    Hematology/Oncology:         -- Anemia:   Cardiovascular:  Cardiovascular Normal Exercise tolerance: good  ECG has been reviewed.   ECG ST   Pulmonary:  Pulmonary Normal    Renal/:  Renal/ Normal     Hepatic/GI:   Abdominal pain, elevated liver enzymes   Neurological:  Neurology Normal    Endocrine:  Endocrine Normal        Physical Exam  General:  Well nourished    Airway/Jaw/Neck:  Airway Findings: Mouth Opening: Normal Tongue: Normal  General Airway Assessment: Adult  Mallampati: III  Improves to II with phonation.  TM Distance: Normal, at least 6 cm  Jaw/Neck Findings:  Neck ROM: Normal ROM       Chest/Lungs:  Chest/Lungs Findings: Clear to auscultation, Normal Respiratory Rate     Heart/Vascular:  Heart Findings: Rate: Normal  Rhythm: Regular Rhythm  Sounds: Normal        Mental Status:  Mental Status Findings:  Cooperative       Lab Results   Component Value Date    WBC 6.78 01/18/2018    HGB 11.8 (L) 01/18/2018    HCT 36.1 (L) 01/18/2018     01/18/2018     (H) 01/18/2018     (H) 01/18/2018     01/18/2018    K 3.2 (L) 01/18/2018     01/18/2018    CREATININE 0.6 01/18/2018    BUN 7 01/18/2018    CO2 26 01/18/2018     Wt Readings from  Last 3 Encounters:   02/01/18 62.4 kg (137 lb 7.3 oz)   01/18/18 61.5 kg (135 lb 9.3 oz)     Temp Readings from Last 3 Encounters:   02/01/18 36.6 °C (97.9 °F) (Oral)   01/18/18 36.8 °C (98.2 °F) (Oral)     BP Readings from Last 3 Encounters:   02/01/18 134/84   01/18/18 125/69     Pulse Readings from Last 3 Encounters:   02/01/18 88   01/18/18 68     No past medical history on file.  Past Surgical History:   Procedure Laterality Date    TUBAL LIGATION       EKG 1/15/18:    Sinus tachycardia  Nonspecific ST and T wave abnormality  Abnormal ECG  No previous ECGs available      Anesthesia Plan  Type of Anesthesia, risks & benefits discussed:  Anesthesia Type:  general  Patient's Preference:   Intra-op Monitoring Plan: standard ASA monitors  Intra-op Monitoring Plan Comments:   Post Op Pain Control Plan: multimodal analgesia  Post Op Pain Control Plan Comments:   Induction:   IV  Beta Blocker:  Patient is not currently on a Beta-Blocker (No further documentation required).       Informed Consent: Patient understands risks and agrees with Anesthesia plan.  Questions answered. Anesthesia consent signed with patient.  ASA Score: 2     Day of Surgery Review of History & Physical:    H&P update referred to the surgeon.         Ready For Surgery From Anesthesia Perspective.

## 2018-03-28 NOTE — H&P
Short Stay Endoscopy History and Physical    PCP - [facility]  Referring Physician - Edward Hull MD  [address]  [address]  [place] [address]    Procedure - ercp  ASA - per anesthesia  Mallampati - per anesthesia  History of Anesthesia problems - no  Family history Anesthesia problems -  no   Plan of anesthesia - General    HPI:  This is a 57 y.o. female here for evaluation of: bile duct stone    Reflux - no  Dysphagia - no  Abdominal pain - no  Diarrhea - no    ROS:  Constitutional: No fevers, chills, No weight loss  CV: No chest pain  Pulm: No cough, No shortness of breath  Ophtho: No vision changes  GI: see HPI  Derm: No rash    Medical History:  has no past medical history on file.    Surgical History:  has a past surgical history that includes Tubal ligation and Gallbladder surgery.    Family History: family history is not on file..    Social History:  reports that she has never smoked. She has never used smokeless tobacco. She reports that she drinks alcohol. She reports that she does not use drugs.    Review of patient's allergies indicates:  No Known Allergies    Medications:   Prescriptions Prior to Admission   Medication Sig Dispense Refill Last Dose    hydrocodone-acetaminophen 5-325mg (NORCO) 5-325 mg per tablet Take 1-2 tablets by mouth every 4 to 6 hours as needed. 45 tablet 0 Past Week at Unknown time       Physical Exam:    Vital Signs:   Vitals:    03/28/18 0728   BP: 135/86   Pulse: 100   Resp: 18   Temp: 98.5 °F (36.9 °C)       General Appearance: Well appearing in no acute distress  Eyes:    No scleral icterus  ENT: Neck supple  Lungs: CTA anteriorly  Heart:  Regular rate  Abdomen: Soft, non tender, non distended with normal bowel sounds.  Extremities: No edema  Skin: No rash    Labs:  Lab Results   Component Value Date    WBC 6.78 01/18/2018    HGB 11.8 (L) 01/18/2018    HCT 36.1 (L) 01/18/2018     01/18/2018     (H) 01/18/2018      (H) 01/18/2018     01/18/2018    K 3.2 (L) 01/18/2018     01/18/2018    CREATININE 0.6 01/18/2018    BUN 7 01/18/2018    CO2 26 01/18/2018       I have explained the risks and benefits of this endoscopic procedure to the patient including but not limited to bleeding, inflammation, infection, perforation, and death.      Vinicio Mancia MD

## 2018-03-28 NOTE — TRANSFER OF CARE
"Anesthesia Transfer of Care Note    Patient: Radha Livingston    Procedure(s) Performed: Procedure(s) (LRB):  ERCP (N/A)    Patient location: GI    Anesthesia Type: MAC    Transport from OR: Transported from OR on room air with adequate spontaneous ventilation    Post pain: adequate analgesia    Post assessment: no apparent anesthetic complications    Post vital signs: stable    Level of consciousness: awake, alert and oriented    Nausea/Vomiting: no nausea/vomiting    Complications: none    Transfer of care protocol was followed      Last vitals:   Visit Vitals  /86 (Patient Position: Lying)   Pulse 100   Temp 36.9 °C (98.5 °F) (Oral)   Resp 18   Ht 4' 10" (1.473 m)   Wt 61.2 kg (135 lb)   SpO2 97%   Breastfeeding? No   BMI 28.22 kg/m²     "

## 2018-03-28 NOTE — PROVATION PATIENT INSTRUCTIONS
Discharge Summary/Instructions after an Endoscopic Procedure  Patient Name: Radha Livingston  Patient MRN: [id number]  Patient YOB: 1960 Wednesday, March 28, 2018  Vinicio Mancia MD  RESTRICTIONS:  During your procedure today, you received medications for sedation.  These   medications may affect your judgment, balance and coordination.  Therefore,   for 24 hours, you have the following restrictions:   - DO NOT drive a car, operate machinery, make legal/financial decisions,   sign important papers or drink alcohol.    ACTIVITY:  The following day: return to full activity including work, except no heavy   lifting, straining or running for 3 days if polyps were removed.  DIET:  Eat and drink normally unless instructed otherwise.     TREATMENT FOR COMMON SIDE EFFECTS:  - Mild abdominal pain, nausea, belching, bloating or excessive gas:  rest,   eat lightly and use a heating pad.  - Sore Throat: treat with throat lozenges and/or gargle with warm salt   water.  - Because air was used during the procedure, expelling large amounts of air   from your rectum or belching is normal.  - If a bowel prep was taken, you may not have a bowel movement for 1-3 days.    This is normal.  SYMPTOMS TO WATCH FOR AND REPORT TO YOUR PHYSICIAN:  1. Abdominal pain or bloating, other than gas cramps.  2. Chest pain.  3. Back pain.  4. Signs of infection such as: chills or fever occurring within 24 hours   after the procedure.  5. Rectal bleeding, which would show as bright red, maroon, or black stools.   (A tablespoon of blood from the rectum is not serious, especially if   hemorrhoids are present.)  6. Vomiting.  7. Weakness or dizziness.  GO DIRECTLY TO THE NEAREST EMERGENCY ROOM IF YOU HAVE ANY OF THE FOLLOWING:      Difficulty breathing              Chills and/or fever over 101 F   Persistent vomiting and/or vomiting blood   Severe abdominal pain   Severe chest pain   Black, tarry stools   Bleeding- more than one  tablespoon   Any other symptom or condition that you feel may need urgent attention  Your doctor recommends these additional instructions:  If any biopsies were taken, your doctors clinic will contact you in 1 to 2   weeks with any results.  You are being discharged to home.   Resume your previous diet.   Continue your present medications.   Return to your referring physician as previously scheduled.  For questions, problems or results please call your physician - Vinicio Mancia MD at Work:  [phone].  EMERGENCY PHONE NUMBER: [phone],  LAB RESULTS: [phone]  IF A COMPLICATION OR EMERGENCY SITUATION ARISES AND YOU ARE UNABLE TO REACH   YOUR PHYSICIAN - GO DIRECTLY TO THE EMERGENCY ROOM.  Vinicio Mancia MD  3/28/2018 8:34:37 AM  This report has been verified and signed electronically.

## 2018-03-28 NOTE — PLAN OF CARE
DISCHARGE INSTRUCTIONS GIVEN to patient and daughter, daughter interpreted. VERBALIZED UNDERSTANDING, VSS, D/C VIA WC  Discharge instructions also given to pt in Italian per HUONG Flores RN

## (undated) DEVICE — SCISSOR 5MMX35CM DIRECT DRIVE

## (undated) DEVICE — TROCAR ENDOPATH XCEL 5MM 7.5CM

## (undated) DEVICE — DISSECTOR CURVED 5DCD

## (undated) DEVICE — INTRODUCER TAUT

## (undated) DEVICE — SOL IRR NACL .9% 3000ML

## (undated) DEVICE — SEE MEDLINE ITEM 156952

## (undated) DEVICE — TROCAR ENDOPATH XCEL 5X75MM

## (undated) DEVICE — GLOVE SURGICAL LATEX SZ 7

## (undated) DEVICE — SLEEVE SCD EXPRESS CALF MEDIUM

## (undated) DEVICE — TROCAR ENDOPATH XCEL 11X100MM

## (undated) DEVICE — SPONGE DERMA 8PLY 2X2

## (undated) DEVICE — CATH CHOLANGIO 4.5F STL TP

## (undated) DEVICE — CLOSURE SKIN STERI STRIP 1/2X4

## (undated) DEVICE — IRRIGATOR ENDOSCOPY DISP.

## (undated) DEVICE — APPLIER CLIP ENDO MED/LG 10MM

## (undated) DEVICE — SUT VCRL ENDOLOOP 0 18 VIOL

## (undated) DEVICE — ELECTRODE REM PLYHSV RETURN 9

## (undated) DEVICE — MANIFOLD 4 PORT

## (undated) DEVICE — SEE MEDLINE ITEM 152622

## (undated) DEVICE — SEE MEDLINE ITEM 146372

## (undated) DEVICE — DRESSING TEGADERM 2 3/8 X 2.75

## (undated) DEVICE — STRIP STERI REIN CLSR 1/2X2IN

## (undated) DEVICE — SUT MCRYL PLUS 4-0 PS2 27IN

## (undated) DEVICE — TROCAR ENDOPATH XCEL 11MM 10CM

## (undated) DEVICE — BAG TISS RETRV MONARCH 10MM